# Patient Record
Sex: MALE | Race: WHITE | Employment: PART TIME | URBAN - METROPOLITAN AREA
[De-identification: names, ages, dates, MRNs, and addresses within clinical notes are randomized per-mention and may not be internally consistent; named-entity substitution may affect disease eponyms.]

---

## 2020-07-21 ENCOUNTER — HOSPITAL ENCOUNTER (EMERGENCY)
Age: 65
Discharge: HOME | End: 2020-07-21
Payer: MEDICARE

## 2020-07-21 ENCOUNTER — APPOINTMENT (OUTPATIENT)
Dept: GENERAL RADIOLOGY | Age: 65
End: 2020-07-21
Payer: MEDICARE

## 2020-07-21 VITALS
SYSTOLIC BLOOD PRESSURE: 146 MMHG | TEMPERATURE: 98.9 F | RESPIRATION RATE: 14 BRPM | HEIGHT: 67 IN | DIASTOLIC BLOOD PRESSURE: 82 MMHG | HEART RATE: 74 BPM | BODY MASS INDEX: 30.38 KG/M2 | OXYGEN SATURATION: 94 % | WEIGHT: 193.56 LBS

## 2020-07-21 PROCEDURE — 73140 X-RAY EXAM OF FINGER(S): CPT

## 2020-07-21 PROCEDURE — 6360000002 HC RX W HCPCS: Performed by: PHYSICIAN ASSISTANT

## 2020-07-21 PROCEDURE — 6370000000 HC RX 637 (ALT 250 FOR IP): Performed by: PHYSICIAN ASSISTANT

## 2020-07-21 PROCEDURE — 90471 IMMUNIZATION ADMIN: CPT | Performed by: PHYSICIAN ASSISTANT

## 2020-07-21 PROCEDURE — 90715 TDAP VACCINE 7 YRS/> IM: CPT | Performed by: PHYSICIAN ASSISTANT

## 2020-07-21 PROCEDURE — 99282 EMERGENCY DEPT VISIT SF MDM: CPT

## 2020-07-21 RX ORDER — BACITRACIN, NEOMYCIN, POLYMYXIN B 400; 3.5; 5 [USP'U]/G; MG/G; [USP'U]/G
OINTMENT TOPICAL ONCE
Status: COMPLETED | OUTPATIENT
Start: 2020-07-21 | End: 2020-07-21

## 2020-07-21 RX ORDER — CEPHALEXIN 500 MG/1
500 CAPSULE ORAL ONCE
Status: COMPLETED | OUTPATIENT
Start: 2020-07-21 | End: 2020-07-21

## 2020-07-21 RX ORDER — CEPHALEXIN 500 MG/1
500 CAPSULE ORAL 4 TIMES DAILY
Qty: 40 CAPSULE | Refills: 0 | Status: SHIPPED | OUTPATIENT
Start: 2020-07-21 | End: 2020-07-31

## 2020-07-21 RX ORDER — SULFAMETHOXAZOLE AND TRIMETHOPRIM 800; 160 MG/1; MG/1
1 TABLET ORAL 2 TIMES DAILY
Qty: 20 TABLET | Refills: 0 | Status: SHIPPED | OUTPATIENT
Start: 2020-07-21 | End: 2020-07-31

## 2020-07-21 RX ORDER — SULFAMETHOXAZOLE AND TRIMETHOPRIM 800; 160 MG/1; MG/1
1 TABLET ORAL ONCE
Status: COMPLETED | OUTPATIENT
Start: 2020-07-21 | End: 2020-07-21

## 2020-07-21 RX ADMIN — TETANUS TOXOID, REDUCED DIPHTHERIA TOXOID AND ACELLULAR PERTUSSIS VACCINE, ADSORBED 0.5 ML: 5; 2.5; 8; 8; 2.5 SUSPENSION INTRAMUSCULAR at 23:26

## 2020-07-21 RX ADMIN — SULFAMETHOXAZOLE AND TRIMETHOPRIM 1 TABLET: 800; 160 TABLET ORAL at 23:25

## 2020-07-21 RX ADMIN — CEPHALEXIN 500 MG: 500 CAPSULE ORAL at 23:25

## 2020-07-21 RX ADMIN — BACITRACIN ZINC, NEOMYCIN SULFATE, AND POLYMYXIN B SULFATE: 400; 3.5; 5 OINTMENT TOPICAL at 23:25

## 2020-07-21 ASSESSMENT — PAIN DESCRIPTION - PAIN TYPE
TYPE: ACUTE PAIN
TYPE: ACUTE PAIN

## 2020-07-21 ASSESSMENT — PAIN DESCRIPTION - ORIENTATION
ORIENTATION: LEFT
ORIENTATION: LEFT

## 2020-07-21 ASSESSMENT — PAIN DESCRIPTION - ONSET
ONSET: ON-GOING
ONSET: ON-GOING

## 2020-07-21 ASSESSMENT — PAIN DESCRIPTION - DESCRIPTORS
DESCRIPTORS: THROBBING
DESCRIPTORS: THROBBING

## 2020-07-21 ASSESSMENT — PAIN DESCRIPTION - PROGRESSION
CLINICAL_PROGRESSION: NOT CHANGED
CLINICAL_PROGRESSION: NOT CHANGED

## 2020-07-21 ASSESSMENT — PAIN DESCRIPTION - FREQUENCY
FREQUENCY: CONTINUOUS
FREQUENCY: CONTINUOUS

## 2020-07-21 ASSESSMENT — PAIN DESCRIPTION - LOCATION
LOCATION: FINGER (COMMENT WHICH ONE)
LOCATION: FINGER (COMMENT WHICH ONE)

## 2020-07-21 ASSESSMENT — PAIN SCALES - GENERAL
PAINLEVEL_OUTOF10: 2
PAINLEVEL_OUTOF10: 2

## 2020-07-22 NOTE — ED PROVIDER NOTES
[address] [facility]      Pt Name: Miguel Arredondo  MRN: [id number]  [place] 1955  Date of evaluation: 7/21/2020  Provider: YAMILETH Christianson    This patient was not seen and evaluated by the attending physician No att. providers found. CHIEF COMPLAINT       Chief Complaint   Patient presents with    Laceration     left hand finger yesterday at 1300        CRITICAL CARE TIME   I performed a total Critical Care time of  15 minutes, excluding separately reportable procedures. There was a high probability of clinically significant/life threatening deterioration in the patient's condition which required my urgent intervention. Not limited to multiple reexaminations, discussions with attending physician and consultants. HISTORY OF PRESENT ILLNESS  (Location/Symptom, Timing/Onset, Context/Setting, Quality, Duration, Modifying Factors, Severity.)   Miguel Arredondo is a 77 y.o. male who presents to the emergency department accompanied by his wife. He has a laceration to the base of his left middle finger. He is right-hand dominant. Tetanus not up-to-date. About 34 hours ago he was working on a car when his hand slipped and a piece of metal cut his finger. He cleaned it out with peroxide but noticed swelling redness and increasing pain today. He is not a diabetic he does not take blood thinners. No numbness or weakness. Nursing Notes were reviewed and I agree. REVIEW OF SYSTEMS    (2-9 systems for level 4, 10 or more for level 5)     Review of Systems   Constitutional: Negative for fever. Musculoskeletal: Positive for arthralgias and joint swelling. Skin: Positive for wound. Neurological: Negative for weakness and numbness. Psychiatric/Behavioral: Negative for agitation, behavioral problems and confusion. Except as noted above the remainder of the review of systems was reviewed and negative.        PAST MEDICAL HISTORY Diagnosis Date    Reflux        SURGICAL HISTORY           Procedure Laterality Date    CARPAL TUNNEL RELEASE Right 13       CURRENT MEDICATIONS       Previous Medications    IBUPROFEN (IBU) 600 MG TABLET    Take 1 tablet by mouth every 8 hours as needed for Pain. ALLERGIES     Patient has no known allergies. FAMILY HISTORY           Problem Relation Age of Onset    High Blood Pressure Mother     Heart Disease Father      Family Status   Relation Name Status    Mother   at age 72        blood clot    Father   at age 61        [place]        SOCIAL HISTORY      reports that he quit smoking about 16 years ago. He does not have any smokeless tobacco history on file. He reports current alcohol use. He reports that he does not use drugs. PHYSICAL EXAM    (up to 7 for level 4, 8 or more for level 5)     ED Triage Vitals [20 2215]   BP Temp Temp Source Pulse Resp SpO2 Height Weight   (!) 146/82 98.9 °F (37.2 °C) Oral 74 14 94 % 5' 7\" (1.702 m) 193 lb 9 oz (87.8 kg)       Physical Exam  Vitals signs and nursing note reviewed. Constitutional:       Appearance: Normal appearance. HENT:      Head: Normocephalic and atraumatic. Cardiovascular:      Rate and Rhythm: Normal rate. Pulses: Normal pulses. Pulmonary:      Effort: Pulmonary effort is normal.   Musculoskeletal: Normal range of motion. Left hand: He exhibits tenderness, laceration and swelling. He exhibits normal range of motion and normal capillary refill. Normal sensation noted. Normal strength noted. Hands:    Skin:     General: Skin is warm. Findings: Erythema present. Neurological:      General: No focal deficit present. Mental Status: He is alert and oriented to person, place, and time.    Psychiatric:         Mood and Affect: Mood normal.         Behavior: Behavior normal.         DIAGNOSTIC RESULTS     RADIOLOGY:   Non-plain film images such as CT, Ultrasound and MRI are read by the radiologist. Plain radiographic images are visualized and preliminarily interpreted by YAMILETH Harris with the below findings:    Reviewed radiologist's interpretation. Interpretation per the Radiologist below, if available at the time of this note:    XR FINGER LEFT (MIN 2 VIEWS)   Final Result   Soft tissue swelling. No acute fracture. No radiopaque foreign body. LABS:  Labs Reviewed - No data to display    All other labs were within normal range or not returned as of this dictation. EMERGENCY DEPARTMENT COURSE and DIFFERENTIAL DIAGNOSIS/MDM:   Vitals:    Vitals:    07/21/20 2215   BP: (!) 146/82   Pulse: 74   Resp: 14   Temp: 98.9 °F (37.2 °C)   TempSrc: Oral   SpO2: 94%   Weight: 193 lb 9 oz (87.8 kg)   Height: 5' 7\" (1.702 m)     I discussed with Ryne Allen and/or family the exam results, diagnosis, care, prognosis, reasons to return and the importance of follow up. Patient and/or family is in full agreement with plan and all questions have been answered. Specific discharge instructions explained, including reasons to return to the emergency department. Ryne Allen is well appearing, non-toxic, and afebrile at the time of discharge. Patient has an approximately 34-hour wound to left proximal finger at the base on the palmar aspect. His tetanus was updated the wound was cleaned and antibiotic ointment applied. Discussed risk of closing the wound with no active bleeding 34 hours later on the hand. Will avoid. Will advise him to use warm soapy soaks at least 3 times a day. He and his wife understand that this could works and he could require surgery or further intervention including IV antibiotics. Wife and patient are going to keep an eye on it and return for new or worsening. He has pain and swelling to the digit itself but has intact strength against resistance Both flexion and extension.     I estimate there is LOW risk for FRACTURE, COMPARTMENT SYNDROME, DEEP VENOUS THROMBOSIS, SEPTIC ARTHRITIS, TENDON OR NEUROVASCULAR INJURY, thus I consider the discharge disposition reasonable. CONSULTS:  None    PROCEDURES:  Procedures      FINAL IMPRESSION      1. Laceration of left hand without foreign body, initial encounter    2.  Finger infection          DISPOSITION/PLAN   DISPOSITION Decision To Discharge 07/21/2020 11:11:08 PM      PATIENT REFERRED TO:  Jeanne Bello MD  [address] [place] [address]  [phone]    Call in 1 day  For follow up      [address]:  New Prescriptions    CEPHALEXIN (KEFLEX) 500 MG CAPSULE    Take 1 capsule by mouth 4 times daily for 10 days    SULFAMETHOXAZOLE-TRIMETHOPRIM (BACTRIM DS) 800-160 MG PER TABLET    Take 1 tablet by mouth 2 times daily for 10 days       (Please note that portions of this note were completed with a voice recognition program.  Efforts were made to edit the dictations but occasionally words are mis-transcribed.)    Colten Nicholas, [address], [place]  07/21/20 9178

## 2020-07-22 NOTE — ED TRIAGE NOTES
Pt to ED with cut on left hand and left middle finger. Pt said he cut finger on piece of metal while working on his car yesterday. Pt states bleeding is difficult to control and has been bleeding intermittently since yesterday. Pt unaware of when last tetanus shot was.

## 2020-07-22 NOTE — ED NOTES
Handoff report given from Calixto, RN. I will assume care at this time.       Shahrzad Quintana RN  07/21/20 1673

## 2020-07-22 NOTE — ED NOTES
Discharge and education instructions reviewed. Patient verbalized understanding, teach-back successful. Patient denied questions at this time. No acute distress noted. Patient instructed to follow-up as noted - return to emergency department if symptoms worsen. Patient verbalized understanding. Discharged per EDMD with discharged instructions.        Unknown DEEPIKA Dong  07/21/20 5748

## 2020-07-27 ENCOUNTER — OFFICE VISIT (OUTPATIENT)
Dept: ORTHOPEDIC SURGERY | Age: 65
End: 2020-07-27
Payer: MEDICARE

## 2020-07-27 VITALS — RESPIRATION RATE: 16 BRPM | TEMPERATURE: 98.3 F

## 2020-07-27 PROCEDURE — G8417 CALC BMI ABV UP PARAM F/U: HCPCS | Performed by: PHYSICIAN ASSISTANT

## 2020-07-27 PROCEDURE — G8428 CUR MEDS NOT DOCUMENT: HCPCS | Performed by: PHYSICIAN ASSISTANT

## 2020-07-27 PROCEDURE — 99203 OFFICE O/P NEW LOW 30 MIN: CPT | Performed by: PHYSICIAN ASSISTANT

## 2020-07-27 PROCEDURE — 1123F ACP DISCUSS/DSCN MKR DOCD: CPT | Performed by: PHYSICIAN ASSISTANT

## 2020-07-27 PROCEDURE — 4004F PT TOBACCO SCREEN RCVD TLK: CPT | Performed by: PHYSICIAN ASSISTANT

## 2020-07-27 PROCEDURE — 4040F PNEUMOC VAC/ADMIN/RCVD: CPT | Performed by: PHYSICIAN ASSISTANT

## 2020-07-27 PROCEDURE — 3017F COLORECTAL CA SCREEN DOC REV: CPT | Performed by: PHYSICIAN ASSISTANT

## 2020-07-27 NOTE — PROGRESS NOTES
Mr. Gem Garduno is a 77 y.o. right handed individual  who is seen today in Hand Surgical Consultation at the request of Silvino Pierre DO. He is seen today regarding an injury occurring on July 20th, 2020. He reports having sustained a laceration to the Volar aspect of the  left Middle Finger when he accidentally cut it with a piece of steel. At the time of injury, there was substantial bleeding & there was not acute sensory loss. He was seen for Emergency evaluation elsewhere, radiographs were obtained. By report, his skin injury was not repaired after the wound was thoroughly cleansed. The nail structures were not involved in the injury and did not require repair at the time. He reports mild pain located in the Volar aspect of the Middle Finger, no tenderness of the wrist or elbow. He notes today, no numbness in the Radial Sensory Nerve Branch and Ulnar Dorsal Sensory Nerve Branch distribution of the Middle Finger. Symptoms are improving over time. The patient's , past medical history, medications, allergies,  family history, social history, and review of systems have been updated, reviewed and have been scanned into the chart today. Physical Exam:  Mr. Loyd Manning most recent vitals:  Vitals  Temp: 98.3 °F (36.8 °C)  Temp Source: Temporal  Resp: 16    He is well nourished, oriented to person, place & time. He demonstrates appropriate mood and affect as well as normal gait and station. Skin: There is  Transverse 1cm laceration on the Volar left Middle Finger closest to the MCP Joint, palmar digital crease which has not been sutured. The nail plate is present. No other digits show sign of skin injury bilaterally. There is no sign of drainage, erythema or infection. Digital range of motion is limited by pain on the Left, normal on the Right.  FDS function is Intact in the Index Finger, Middle Finger, Ring Finger and Small Finger, FDP function is Intact in the Index Finger, Middle Finger, Ring Finger and Small Finger, common extensor function is Intact in the Index Finger, Middle Finger, Ring Finger and Small Finger. Thumb EPL Function is Intact, Thumb EPB Function is Intact, Thumb FPL Function is Intact. All other digits demonstrate intact tendon function and motion bilaterally. Kanavel's Sign (for suppurative flexor tenosynovitis) is Negative in the left Middle Finger. The involved finger is not held at rest in a position of flexion, does not have palpable fullness along the flexor tendon sheath, does not have pain to palpation of the flexor tendon sheath, & is not exceedingly painful with passive flexion and extension. Wrist range of motion is Full and equal bilaterally bilaterally  There is no evidence of gross joint instability bilaterally. Muscular strength is clinically appropriate bilaterally, excepting the injured structures noted above. Sensation is normal in the Radial Sensory Nerve Branch and Ulnar Dorsal Sensory Nerve Branch distribution of the left Middle Finger all other digits demonstrate normal sensation bilaterally. Vascular examination reveals normal, good capillary refill and good color bilaterally. There is mild acute ecchymosis in the injured digit(s). Swelling is mild in the Middle Finger, absent elsewhere bilaterally  Maximal pain is elicited with palpation of the Volar region of the Middle Finger on the Left, normal on the Right. The base of the hand & wrist are not tender to palpation      Impression:  Mr. Nat Shelby has sustained recent left Middle Finger laceration without associated nerve laceration, without associated tendon laceration. He  presents requesting further treatment. Plan:  I have discussed with Mr. Nat Shelby the various treatment options for treatment of his left Middle Finger Laceration.   We discussed the options of Conservative treatment, allowing the soft tissues and bony structures to heal as well as possible, and surgical treatment in the form of formal exploration and repair of the injured structures as possible. he has elected to proceed conservativly, voicing an understanding of the other options available to him. I have explained the complications, limitations, expectations, alternatives, & risks of his chosen treatment. We discussed the possibility of residual symptoms as may be related to non-operative treatment of the lacerated finger, including the possiblities of: infection, poor healing of skin, and other involved structures, persistant deformity, persistant pain, persistent sensory loss, limitation of motion, future arthritic symptoms & the possible need for further treatment. He understood our discussion and was comfortable with his decision; he was provided with appropriate expectations. He is today fitted with a carefully applied finger dressing and given instruction in dressing changes. He was instructed in proper wound care and maintenance. I have asked him to schedule a follow-up appointment for 2 weeks from now for re-evaluation. He is specifically instructed to contact the office between now & his scheduled appointment if he has concerns related to the injured finger. He is welcome to call for an appointment sooner if he has any additional concerns or questions. I have also discussed with Mr. Tyrone Carrasco  the other treatment options available to him  for this condition. We have today selected to proceed with conservative management. He and I have agreed that if our current course of conservative treatment does not prove to be effective over the short term future, that he will schedule a follow-up appointment to discuss and select an alternate course of therapy including possibly injection or surgical treatment. Mr. Tyrone Carrasco has been given a full verbal list of instructions and precautions related to his present condition.   I have asked him to followup with me in the office at the prescribed time. He is also specifically requested to call or return to the office sooner if his symptoms change or worsen prior to the next scheduled appointment.

## 2020-07-27 NOTE — PATIENT INSTRUCTIONS
Protocol for Managing Open Wounds  Dr. Nicole Whiting. César            1. Please remove all dressings so that you may see the skin fully. 2. Prepare a clean sink full of warm water (bath temperature). Add one to two squirts of liquid antibacterial soap. Insert hand and soak for approximately five minutes, making sure to exercise your motion of fingers and wrists fully while soaking in warm water. 3. After finished soaking, pat wound dry. 4. Apply dressing as instructed in the office. A. Layer #1 - Adaptic gauze. B. Layer #2 - Light cotton gauze. Voncile Phil #3 - Tape or other covering. 5. I typically recommend soaking two to three times per day with a clean dressing change after each soak. Thank you for choosing [facility]) Physicians for your Hand and Upper Extremity needs. If we can be of any further assistance to you, please do not hesitate to contact us.     Office Phone Number:  [phone]  or  [phone]

## 2020-08-17 ENCOUNTER — OFFICE VISIT (OUTPATIENT)
Dept: ORTHOPEDIC SURGERY | Age: 65
End: 2020-08-17
Payer: MEDICARE

## 2020-08-17 VITALS — HEIGHT: 67 IN | WEIGHT: 193 LBS | RESPIRATION RATE: 16 BRPM | TEMPERATURE: 98.1 F | BODY MASS INDEX: 30.29 KG/M2

## 2020-08-17 PROBLEM — S61.213A LACERATION OF LEFT MIDDLE FINGER WITHOUT FOREIGN BODY WITHOUT DAMAGE TO NAIL: Status: ACTIVE | Noted: 2020-08-17

## 2020-08-17 PROCEDURE — G8417 CALC BMI ABV UP PARAM F/U: HCPCS | Performed by: PHYSICIAN ASSISTANT

## 2020-08-17 PROCEDURE — 3017F COLORECTAL CA SCREEN DOC REV: CPT | Performed by: PHYSICIAN ASSISTANT

## 2020-08-17 PROCEDURE — 1036F TOBACCO NON-USER: CPT | Performed by: PHYSICIAN ASSISTANT

## 2020-08-17 PROCEDURE — 4040F PNEUMOC VAC/ADMIN/RCVD: CPT | Performed by: PHYSICIAN ASSISTANT

## 2020-08-17 PROCEDURE — 99213 OFFICE O/P EST LOW 20 MIN: CPT | Performed by: PHYSICIAN ASSISTANT

## 2020-08-17 PROCEDURE — 1123F ACP DISCUSS/DSCN MKR DOCD: CPT | Performed by: PHYSICIAN ASSISTANT

## 2020-08-17 PROCEDURE — G8427 DOCREV CUR MEDS BY ELIG CLIN: HCPCS | Performed by: PHYSICIAN ASSISTANT

## 2020-08-17 NOTE — PROGRESS NOTES
bilaterally on the Left, normal on the Right  Vascular examination reveals normal, good capillary refill and good color bilaterally. There is no acute ecchymosis. Swelling is mild in the Middle Finger on the Left, normal on the Right  Muscular strength is clinically appropriate bilaterally. Examination for Stenosing Tenosynovitis demonstrates mild tenderness, thickening & nodularity at the A-1 pulley(s) of the Left Middle Finger. There is a palpable Nota's Node. There is triggering on active flexion with pain. No other digits demonstrate evidence of Stenosing Tenosynovitis. Impression:  Mr. Viral Velasquez is doing fairly well after recent left Middle Finger laceration. It would appear that he has substantially healed his injury. Plan:  Mr. Viral Velasquez is instructed in the appropriate short term protection of his freshly healing finger injury. We discussed the use of either a removable protective orthosis or activity adjustments as the situation demands. He is demonstrated the application and use of both devices. He is also instructed in work on Active & Passive range of motion of the digits, wrist, & elbow. These modalities were demonstrated to him today. We discussed the continued restrictions on the use of the injured hand and the limitations on resumption of activities until full range of motion and comfort have been regained. I have explained the time course and likely expectations for maximal recovery of motion and function. I have had a thorough discussion with Mr. Viral Velasquez regarding the treatment options available for his initially presenting left  Middle Finger stenosing tenosynovitis as a result of his injury, which is causing him significant symptoms and difficulty. I have outlined for Mr. Viral Velasquez the risk, benefits and consequences of the various treatment modalities, including a reasonable expectation for the long term success of each.   We have discussed

## 2020-08-17 NOTE — PATIENT INSTRUCTIONS
Thank you for choosing [facility]) Physicians for your Hand and Upper Extremity needs. If we can be of any further assistance to you, please do not hesitate to contact us.     Office Phone Number:  [phone]  or  [phone]

## 2020-09-25 ENCOUNTER — OFFICE VISIT (OUTPATIENT)
Dept: ORTHOPEDIC SURGERY | Age: 65
End: 2020-09-25
Payer: MEDICARE

## 2020-09-25 VITALS — RESPIRATION RATE: 16 BRPM | BODY MASS INDEX: 30.29 KG/M2 | HEIGHT: 67 IN | WEIGHT: 193 LBS | TEMPERATURE: 98.4 F

## 2020-09-25 PROBLEM — M65.332 TRIGGER MIDDLE FINGER OF LEFT HAND: Status: ACTIVE | Noted: 2020-09-25

## 2020-09-25 PROCEDURE — 99214 OFFICE O/P EST MOD 30 MIN: CPT | Performed by: PHYSICIAN ASSISTANT

## 2020-09-25 PROCEDURE — 1036F TOBACCO NON-USER: CPT | Performed by: PHYSICIAN ASSISTANT

## 2020-09-25 PROCEDURE — 4040F PNEUMOC VAC/ADMIN/RCVD: CPT | Performed by: PHYSICIAN ASSISTANT

## 2020-09-25 PROCEDURE — 1123F ACP DISCUSS/DSCN MKR DOCD: CPT | Performed by: PHYSICIAN ASSISTANT

## 2020-09-25 PROCEDURE — G8417 CALC BMI ABV UP PARAM F/U: HCPCS | Performed by: PHYSICIAN ASSISTANT

## 2020-09-25 PROCEDURE — 3017F COLORECTAL CA SCREEN DOC REV: CPT | Performed by: PHYSICIAN ASSISTANT

## 2020-09-25 PROCEDURE — G8427 DOCREV CUR MEDS BY ELIG CLIN: HCPCS | Performed by: PHYSICIAN ASSISTANT

## 2020-09-25 NOTE — PATIENT INSTRUCTIONS
Pre-Operative Instructions    1. The night before your surgery, unless otherwise instructed, do not eat any food, drink any liquids, chew gum or mints after midnight. Abstain from alcohol for 24 hours prior to surgery. 2. You will be contacted by the Hospital the working day prior to your procedure to confirm your arrival time. 3. Patients under 25years of age must have a parent or legal guardian present to sign their consent and discharge paperwork. 4. On the day of surgery,  you will be seen pre-operatively by an anesthesiologist.     5. If you are having hand surgery, it is recommended that nail polish and acrylic nails be removed prior to surgery if possible. 6. Please bring cases for glasses, contact lenses, hearing aids or dentures. They will likely be removed prior to surgery. 7. Wear casual, loose-fitting and comfortable clothing. Consider that you may have a large dressing to fit under your clothing after surgery. 9. Please do not bring valuables such as jewelry or large sums of cash to the hospital. Remove all body piercings before coming to the hospital. Jesu Perez may not  wear any rings on the hand if you are having surgery on that hand, wrist or elbow. 10. Do not smoke or chew tobacco before your surgery. [address] [place] [address] and surgery facilities are smoke-free environments. Smoking is not permitted anywhere on campus. 11. Be sure to follow any additional instructions from your physician. If the above conditions are not met, your surgery may be cancelled and rescheduled for another day. Should you develop any change in your health such as fever, cough, sore throat, cold, flu, or infection, or if you have any questions regarding your Pre-admission or surgery, please contact [address] - Surgery Scheduling at [phone], Monday through Friday, 9 a.m. to 5 p.m.

## 2020-09-25 NOTE — LETTER
? We have discussed the specific limitations and risks of hospital and/or office based treatment at this time due to the COVID-19 pandemic                I have been counseled about the risks of alicia Covid-19 in the helen-operative and post-operative periods related to this procedure. I have been made aware that alicia Covid-19 around the time of a surgical procedure may worsen my prognosis for recovering from the virus and lend to a higher morbidity and or mortality risk. With this knowledge, I have requested to proceed with the procedure as scheduled. 4. I have also been informed by the informing physician that there are other risks from both known and unknown causes that are attendant to the performance of any surgical procedure. I am aware that the practice of medicine and surgery is not an exact science, and that no guarantees have been made to me concerning the results of the operation and/or procedure(s). 5. I   CONSENT / REFUSE CONSENT  (strike the phrase that does not apply) to the taking of photographs before, during and/or after the operation or procedure for scientific/educational purposes. 6. I consent to the administration of anesthesia and to the use of such anesthetics as may be deemed advisable by the anesthesiologist who has been engaged by me or my physician.     7. I certify that I have read and understand the above consent to operation and/or other procedure(s); that the explanations therein referred to were made to me by the informing physician in advance of my signing this consent; that all blanks or statements requiring insertion or completion were filled in and inapplicable paragraphs, if any, were stricken before I signed; and that all questions asked by me about the operation and/or procedure(s) which I have consented to have been fully answered in a satisfactory manner.                                 _______________________           9/25/20 to explain your absence. A doctors note, or official forms (BWC, FMLA, etc.) will be filled out, upon request, to indicate your date of surgery and your restrictions as stated above. Dr. Rikki Boone' Narcotic Policy  Patients will only be prescribed narcotics after surgical procedures or significant injury. Not all procedures cause pain great enough to require Narcotics and thus, not all patients will receive prescriptions after surgical procedures or injuries. Narcotics are never prescribed for chronic conditions. Narcotics are never prescribed for use longer than one week at a time. Refills are only granted in unusual circumstances and only at Dr. Lorriane Merlin discretion. Patients who are receiving narcotic medication from another physician or who are under pain management contracts will not be given a prescription for narcotics for any reason. I have read the above policies and understand that by agreeing to proceed with treatment by Dr. Noe Alva and his team, that I am agreeing to abide by these policies.   Patient Name:  Jihan Panchal    Patient Signature:  _____________________________    Alida Link Date:   9/25/20

## 2020-09-25 NOTE — LETTER
Pre-Operative Antibiotic Order:    No Known Allergies       [x]  ----  No Antibiotic Ordered       []  ----  Give the following Antibiotic within 1 hour prior to start time:         Ancef 1 gram IV if patient is less than 200 pounds    or       Ancef 2 grams IV if patient is greater than 200 pounds    or      Vancomycin 1 gram IV (over 1 hour) if patient is allergic to           PENICILLINS or CEFALOSPORINS          SURG       10-15                   COVID  10-9                        H&P                  PCP__XX__      Procedure: left Middle Finger Trigger Finger Release     Patient: Viral Velasquez  :    1955    Physician Signature:     Date: 20  Time: 10:00 AM

## 2020-09-25 NOTE — PROGRESS NOTES
Mr. Viktor Campbell returns today in follow-up of his recent left Middle Finger Volar laceration which occurred approximately 2 months ago. He has been treated with conservative wound care measures and range of motion  He has noted increased discomfort and increased swelling. He states that he has trouble fully bending his finger and that his symptoms are worse when he wakes up in the morning. He notes no symptoms of numbness, tingling, no symptoms related to perfusion. The patient's , past medical history, medications, allergies,  family history, social history, and review of systems have been reviewed and are recorded in the chart. Physical Exam:  Vitals  Temp: 98.4 °F (36.9 °C)  Temp Source: Infrared  Resp: 16  Height: 5' 7\" (170.2 cm)  Weight: 193 lb (87.5 kg)  Mr. Viktor Campbell appears well, he is in no apparent distress, he demonstrates appropriate mood & affect. Skin: Laceration is fully healed Left, Middle Finger  Digital range of motion is limited by pain and swelling on the Left, normal on the Right. FDS function is Intact in the Thumb, FDP function is Intact in the Middle Finger, common extensor function is Intact in the Middle Finger. Thumb EPL Function is Intact, Thumb EPB Function is Intact, Thumb FPL Function is Intact. Tendon function is otherwise normal bilaterally. Wrist range of motion is Full and equal bilaterally bilaterally  There is no evidence of gross joint instability bilaterally. Sensation is subjectively normal in the Radial Digital Nerve and Ulnar Digital Nerve distribution of the left Middle Finger all other digits demonstrate normal sensation bilaterally on the Left, normal on the Right  Vascular examination reveals normal, good capillary refill and good color bilaterally. There is no acute ecchymosis. Swelling is mild in the Middle Finger on the Left, normal on the Right  Muscular strength is clinically appropriate bilaterally.   Examination for Stenosing Tenosynovitis demonstrates moderate tenderness, thickening & nodularity at the A-1 pulley(s) of the Left Middle Finger. There is a palpable Nota's Node. There is triggering on active flexion with pain. No other digits demonstrate evidence of Stenosing Tenosynovitis. Impression:  Mr. Blake Helm is doing well after recent left Middle Finger laceration. It would appear that he has substantially healed his injury. It appears that he has now developed a left middle finger trigger finger. Plan:  I have had a thorough discussion with Mr. Blake Helm regarding the treatment options available for his initially presenting Middle Finger stenosing tenosynovitis, which is causing him significant  limitations. I have outlined for Mr. Blake Helm the benefits and consequences of the various treatment modalities, including the fact that surgical treatment is the only modality which is reasonably expected to provide long lasting or permanent resolution of his symptoms. Based upon our current discussion and a reasonable understating of the options available to him, Mr. Blake Helm has selected to proceed with surgical Middle Finger Trigger Finger Release. I have discussed the details of the surgical procedure, the pre, helen and postoperative concerns and the appropriate expectations after surgery with Mr. Blake Helm today. He was given the opportunity to ask questions, voiced an understanding of the procedure, and he did wish to proceed with Left Middle Finger Trigger Finger Release (A1 Pulley Release). I had an extensive discussion with Mr. Blaek Helm (and any family members present with him today) regarding the natural history, etiology, and long term consequences of this problem. We have mutually selected a surgical treatment plan  and, in my opinion, surgical intervention is indicated at this time.  I have discussed with him the potential complications, limitations, expectations, alternatives, and risks of Trigger Finger Release. He has had full opportunity to ask his questions. I have answered them all to his satisfaction. I feel that Mr. Ton Castanon (and any family members present with him today) do understand our discussion today and he has provided informed consent for Left Middle Finger Trigger Finger Release (A1 Pulley Release). I have also discussed with Mr. Ton Castanon the other treatment options available to him for this condition. We have today selected to proceed with Surgical treatment. He has voiced and  understanding that there are other less aggressive treatment options which are available in this situation, albeit possibly less efficacious or durable, and he is comfortable with the plan that he has chosen. Mr. Ton Castanon has been given a full verbal list of instructions and precautions related to his present condition. I have asked him to followup with me in the office at the prescribed time. He is also specifically requested to call or return to the office sooner if his symptoms change or worsen prior to the next scheduled appointment.

## 2020-10-09 ENCOUNTER — OFFICE VISIT (OUTPATIENT)
Dept: PRIMARY CARE CLINIC | Age: 65
End: 2020-10-09
Payer: MEDICARE

## 2020-10-09 PROCEDURE — 99211 OFF/OP EST MAY X REQ PHY/QHP: CPT | Performed by: NURSE PRACTITIONER

## 2020-10-09 PROCEDURE — G8428 CUR MEDS NOT DOCUMENT: HCPCS | Performed by: NURSE PRACTITIONER

## 2020-10-09 PROCEDURE — G8417 CALC BMI ABV UP PARAM F/U: HCPCS | Performed by: NURSE PRACTITIONER

## 2020-10-10 LAB — SARS-COV-2, NAA: NOT DETECTED

## 2020-10-12 NOTE — PROGRESS NOTES
Pre-admission testing medication reconciliation attempted today. Martin Gonzalez is unsure of current medications. Patient chart sent to [facility] for further review.     Rosibel Michaels RN

## 2020-10-12 NOTE — PROGRESS NOTES

## 2020-10-12 NOTE — PROGRESS NOTES
[address] [facility] [place] time_______0830_____        Surgery time_____1005_______    Take the following medications with a sip of water: Follow your MD/Surgeons pre-procedure instructions regarding your medications    Do not eat or drink anything after 12:00 midnight prior to your surgery. This includes water chewing gum, mints and ice chips. You may brush your teeth and gargle the morning of your surgery, but do not swallow the water     Please see your family doctor/pediatrician for a history and physical and/or concerning medications. Bring any test results/reports from your physicians office. If you are under the care of a heart doctor or specialist doctor, please be aware that you may be asked to them for clearance    You may be asked to stop blood thinners such as Coumadin, Plavix, Fragmin, Lovenox, etc., or any anti-inflammatories such as:  Aspirin, Ibuprofen, Advil, Naproxen prior to your surgery. We also ask that you stop any OTC medications such as fish oil, vitamin E, glucosamine, garlic, Multivitamins, COQ 10, etc.    We ask that you do not smoke 24 hours prior to surgery  We ask that you do not  drink any alcoholic beverages 24 hours prior to surgery     You must make arrangements for a responsible adult to take you home after your surgery. For your safety you will not be allowed to leave alone or drive yourself home. Your surgery will be cancelled if you do not have a ride home. Also for your safety, it is strongly suggested that someone stay with you the first 24 hours after your surgery. A parent or legal guardian must accompany a child scheduled for surgery and plan to stay at the hospital until the child is discharged. Please do not bring other children with you. For your comfort, please wear simple loose fitting clothing to the hospital.  Please do not bring valuables.     Do not wear any make-up or nail polish on your fingers or toes      For your safety, please do not wear any jewelry or body piercing's on the day of surgery. All jewelry must be removed. If you have dentures, they will be removed before going to operating room. For your convenience, we will provide you with a container. If you wear contact lenses or glasses, they will be removed, please bring a case for them. If you have a living will and a durable power of  for healthcare, please bring in a copy. As part of our patient safety program to minimize surgical site infections, we ask you to do the following:    · Please notify your surgeon if you develop any illness between         now and the  day of your surgery. · This includes a cough, cold, fever, sore throat, nausea,         or vomiting, and diarrhea, etc.  ·  Please notify your surgeon if you experience dizziness, shortness         of breath or blurred vision between now and the time of your surgery. Do not shave your operative site 96 hours prior to surgery. For face and neck surgery, men may use an electric razor 48 hours   prior to surgery. You may shower the night before surgery or the morning of   your surgery with an antibacterial soap. You will need to bring a photo ID and insurance card    [facility] has an onsite pharmacy, would you like to utilize our pharmacy     If you will be staying overnight and use a C-pap machine, please bring   your C-pap to hospital     Our goal is to provide you with excellent care, therefore, visitors will be limited to two(2) in the room at a time so that we may focus on providing this care for you. Please contact pre-admission testing if you have any further questions. [facility] phone number:  [phone] PAT fax number:  [phone]  Please note these are generalized instructions for all surgical cases, you may be provided with more specific instructions according to your surgery.

## 2020-10-12 NOTE — PROGRESS NOTES
C-Difficile admission screening and protocol:     * Admitted with diarrhea? YES____    NO__X___     *Prior history of C-Diff. In last 3 months? YES____   NO__X___     *Antibiotic use in the past 6-8 weeks? NO__X____YES______                 If yes which  ANTIBIOTIC AND REASON______     *Prior hospitalization or nursing home in the last month?  YES____   NO__X__

## 2020-10-13 ENCOUNTER — TELEPHONE (OUTPATIENT)
Dept: PHARMACY | Age: 65
End: 2020-10-13

## 2020-10-13 DIAGNOSIS — G56.00 CTS (CARPAL TUNNEL SYNDROME): ICD-10-CM

## 2020-10-13 RX ORDER — FAMOTIDINE 40 MG/1
1 TABLET, FILM COATED ORAL 2 TIMES DAILY
COMMUNITY
Start: 2020-07-30 | End: 2020-11-12 | Stop reason: SDUPTHER

## 2020-10-13 RX ORDER — LEVOTHYROXINE SODIUM 125 UG/1
1 TABLET ORAL DAILY
COMMUNITY
Start: 2020-07-31 | End: 2020-11-18

## 2020-10-13 RX ORDER — IBUPROFEN 200 MG
200-400 TABLET ORAL EVERY 6 HOURS PRN
COMMUNITY

## 2020-10-13 RX ORDER — TADALAFIL 20 MG/1
20 TABLET ORAL PRN
COMMUNITY
End: 2021-12-27 | Stop reason: SDUPTHER

## 2020-10-13 RX ORDER — HYDROXYZINE HYDROCHLORIDE 25 MG/1
1 TABLET, FILM COATED ORAL 3 TIMES DAILY PRN
COMMUNITY
Start: 2020-09-30 | End: 2020-11-06 | Stop reason: SDUPTHER

## 2020-10-13 NOTE — TELEPHONE ENCOUNTER
Medication Reconciliation    List of medications patient is currently taking is complete.     Source of information: 1. Conversation with patient by phone.  [phone]. [facility] records      Allergies  Patient has no known allergies.     Notes regarding home medications:   1. Patient currently taking ibuprofen 200mg (1-2Q6H PRN) Last taken 10/10/20    2.  Patient currently taking Tadalafil 20mg (1QD PRN) Last taken roughly 10/06/20         Tana CROSS   10/13/2020  1:48 PM

## 2020-10-13 NOTE — RESULT ENCOUNTER NOTE
Your Covid-10 teste resulted not detected/negative. What happens if I have a negative test?    Remember to wash your hands often, avoid touching your face, stay 6 feet from people you do not live with, and wear a cloth facemask when you go out in public. A negative COVID-19 test at one point in time does not mean you will stay negative. You could become ill with COVID-19 and/or test positive at any time. If you are a close contact of a confirmed or suspected case, continue to stay home and away from others until 14 days after your last exposure. If you do not have symptoms, and were not in close contact with a confirmed or suspected case, you can stop isolating. If you currently have symptoms of COVID-19, and were not in close contact with a confirmed or suspected case, you should keep monitoring symptoms and talk to your doctor or other healthcare provider about staying home and if you need to get tested again. If you develop symptoms of COVID-19, stay at home and away from others and talk to your doctor or other healthcare provider about getting tested again. For additional information, visit coronavirus. [place].gov. For answers to your COVID-19 questions, call [phone]-[facility] ([phone]).

## 2020-10-13 NOTE — TELEPHONE ENCOUNTER
Medication Reconciliation    List of medications patient is currently taking is complete.     Source of information: 1. Conversation with patient by phone. 2. EPIC records      Allergies  Patient has no known allergies.     Notes regarding home medications:   1. Patient currently taking ibuprofen 200mg (1-2Q6H PRN) Last taken. ...         Jose Brooks   10/13/2020  1:33 PM

## 2020-10-14 ENCOUNTER — ANESTHESIA EVENT (OUTPATIENT)
Dept: OPERATING ROOM | Age: 65
End: 2020-10-14
Payer: MEDICARE

## 2020-10-15 ENCOUNTER — HOSPITAL ENCOUNTER (OUTPATIENT)
Age: 65
Setting detail: OUTPATIENT SURGERY
Discharge: HOME | End: 2020-10-15
Attending: ORTHOPAEDIC SURGERY | Admitting: ORTHOPAEDIC SURGERY
Payer: MEDICARE

## 2020-10-15 ENCOUNTER — ANESTHESIA (OUTPATIENT)
Dept: OPERATING ROOM | Age: 65
End: 2020-10-15
Payer: MEDICARE

## 2020-10-15 VITALS
SYSTOLIC BLOOD PRESSURE: 148 MMHG | DIASTOLIC BLOOD PRESSURE: 91 MMHG | RESPIRATION RATE: 16 BRPM | HEART RATE: 53 BPM | BODY MASS INDEX: 29.78 KG/M2 | TEMPERATURE: 97.8 F | WEIGHT: 189.71 LBS | HEIGHT: 67 IN | OXYGEN SATURATION: 96 %

## 2020-10-15 VITALS — DIASTOLIC BLOOD PRESSURE: 88 MMHG | OXYGEN SATURATION: 100 % | SYSTOLIC BLOOD PRESSURE: 125 MMHG

## 2020-10-15 PROCEDURE — 3700000000 HC ANESTHESIA ATTENDED CARE: Performed by: ORTHOPAEDIC SURGERY

## 2020-10-15 PROCEDURE — 2709999900 HC NON-CHARGEABLE SUPPLY: Performed by: ORTHOPAEDIC SURGERY

## 2020-10-15 PROCEDURE — 7100000001 HC PACU RECOVERY - ADDTL 15 MIN: Performed by: ORTHOPAEDIC SURGERY

## 2020-10-15 PROCEDURE — 7100000011 HC PHASE II RECOVERY - ADDTL 15 MIN: Performed by: ORTHOPAEDIC SURGERY

## 2020-10-15 PROCEDURE — 3600000015 HC SURGERY LEVEL 5 ADDTL 15MIN: Performed by: ORTHOPAEDIC SURGERY

## 2020-10-15 PROCEDURE — 3600000005 HC SURGERY LEVEL 5 BASE: Performed by: ORTHOPAEDIC SURGERY

## 2020-10-15 PROCEDURE — 2580000003 HC RX 258: Performed by: ANESTHESIOLOGY

## 2020-10-15 PROCEDURE — 2500000003 HC RX 250 WO HCPCS: Performed by: ORTHOPAEDIC SURGERY

## 2020-10-15 PROCEDURE — 2500000003 HC RX 250 WO HCPCS: Performed by: NURSE ANESTHETIST, CERTIFIED REGISTERED

## 2020-10-15 PROCEDURE — 7100000010 HC PHASE II RECOVERY - FIRST 15 MIN: Performed by: ORTHOPAEDIC SURGERY

## 2020-10-15 PROCEDURE — 7100000000 HC PACU RECOVERY - FIRST 15 MIN: Performed by: ORTHOPAEDIC SURGERY

## 2020-10-15 PROCEDURE — 6360000002 HC RX W HCPCS: Performed by: NURSE ANESTHETIST, CERTIFIED REGISTERED

## 2020-10-15 PROCEDURE — 3700000001 HC ADD 15 MINUTES (ANESTHESIA): Performed by: ORTHOPAEDIC SURGERY

## 2020-10-15 RX ORDER — LIDOCAINE HYDROCHLORIDE 20 MG/ML
INJECTION, SOLUTION EPIDURAL; INFILTRATION; INTRACAUDAL; PERINEURAL PRN
Status: DISCONTINUED | OUTPATIENT
Start: 2020-10-15 | End: 2020-10-15 | Stop reason: SDUPTHER

## 2020-10-15 RX ORDER — PROPOFOL 10 MG/ML
INJECTION, EMULSION INTRAVENOUS PRN
Status: DISCONTINUED | OUTPATIENT
Start: 2020-10-15 | End: 2020-10-15 | Stop reason: SDUPTHER

## 2020-10-15 RX ORDER — SODIUM CHLORIDE 0.9 % (FLUSH) 0.9 %
10 SYRINGE (ML) INJECTION EVERY 12 HOURS SCHEDULED
Status: DISCONTINUED | OUTPATIENT
Start: 2020-10-15 | End: 2020-10-15 | Stop reason: HOSPADM

## 2020-10-15 RX ORDER — SODIUM CHLORIDE 9 MG/ML
INJECTION, SOLUTION INTRAVENOUS CONTINUOUS
Status: DISCONTINUED | OUTPATIENT
Start: 2020-10-15 | End: 2020-10-15 | Stop reason: HOSPADM

## 2020-10-15 RX ORDER — PROPOFOL 10 MG/ML
INJECTION, EMULSION INTRAVENOUS CONTINUOUS PRN
Status: DISCONTINUED | OUTPATIENT
Start: 2020-10-15 | End: 2020-10-15 | Stop reason: SDUPTHER

## 2020-10-15 RX ORDER — SODIUM CHLORIDE 0.9 % (FLUSH) 0.9 %
10 SYRINGE (ML) INJECTION PRN
Status: DISCONTINUED | OUTPATIENT
Start: 2020-10-15 | End: 2020-10-15 | Stop reason: HOSPADM

## 2020-10-15 RX ADMIN — SODIUM CHLORIDE: 9 INJECTION, SOLUTION INTRAVENOUS at 08:59

## 2020-10-15 RX ADMIN — PROPOFOL 80 MG: 10 INJECTION, EMULSION INTRAVENOUS at 10:05

## 2020-10-15 RX ADMIN — PROPOFOL 120 MCG/KG/MIN: 10 INJECTION, EMULSION INTRAVENOUS at 10:05

## 2020-10-15 RX ADMIN — LIDOCAINE HYDROCHLORIDE 80 MG: 20 INJECTION, SOLUTION EPIDURAL; INFILTRATION; INTRACAUDAL; PERINEURAL at 10:05

## 2020-10-15 ASSESSMENT — PAIN DESCRIPTION - FREQUENCY
FREQUENCY: CONTINUOUS
FREQUENCY: CONTINUOUS

## 2020-10-15 ASSESSMENT — PULMONARY FUNCTION TESTS
PIF_VALUE: 0

## 2020-10-15 ASSESSMENT — PAIN - FUNCTIONAL ASSESSMENT
PAIN_FUNCTIONAL_ASSESSMENT: PREVENTS OR INTERFERES SOME ACTIVE ACTIVITIES AND ADLS
PAIN_FUNCTIONAL_ASSESSMENT: PREVENTS OR INTERFERES SOME ACTIVE ACTIVITIES AND ADLS
PAIN_FUNCTIONAL_ASSESSMENT: 0-10

## 2020-10-15 ASSESSMENT — PAIN DESCRIPTION - PAIN TYPE
TYPE: SURGICAL PAIN
TYPE: SURGICAL PAIN

## 2020-10-15 ASSESSMENT — PAIN DESCRIPTION - PROGRESSION
CLINICAL_PROGRESSION: NOT CHANGED
CLINICAL_PROGRESSION: NOT CHANGED

## 2020-10-15 ASSESSMENT — PAIN DESCRIPTION - ORIENTATION
ORIENTATION: LEFT
ORIENTATION: LEFT

## 2020-10-15 ASSESSMENT — PAIN DESCRIPTION - ONSET
ONSET: ON-GOING
ONSET: ON-GOING

## 2020-10-15 ASSESSMENT — ENCOUNTER SYMPTOMS: SHORTNESS OF BREATH: 0

## 2020-10-15 ASSESSMENT — PAIN DESCRIPTION - DESCRIPTORS
DESCRIPTORS: SORE
DESCRIPTORS: OTHER (COMMENT)

## 2020-10-15 ASSESSMENT — PAIN SCALES - GENERAL
PAINLEVEL_OUTOF10: 0
PAINLEVEL_OUTOF10: 1
PAINLEVEL_OUTOF10: 0
PAINLEVEL_OUTOF10: 1

## 2020-10-15 ASSESSMENT — PAIN DESCRIPTION - LOCATION
LOCATION: WRIST
LOCATION: WRIST

## 2020-10-15 NOTE — PROGRESS NOTES
Patient admitted to PACU from OR. Patient opens eyes to name, drowsy. Resp easy unlabored on 2L NC with SaO2 97%. Left wrist dressing dry and intact, elevated on pillow. Patient able to move fingers to left hand with middle finger numbness per patient. Patient denies C/O pain or nausea. VSS. IV patent to right hand.

## 2020-10-15 NOTE — H&P
Pre-operative Update of H&P:    I  have seen & examined Mr. Maggie Gibbs related solely to his hand and upper extremity conditions, prior to the scheduled procedure on the date of his surgery. The indications for the planned surgical procedure & and his upper-extremity condition are unchanged.

## 2020-10-15 NOTE — ANESTHESIA POSTPROCEDURE EVALUATION
1110, Pulse:50, Resp:18, SpO2:96 %  10/15/20 1109, Pulse:54, Resp:18, SpO2:95 %  10/15/20 1108, Pulse:51, Resp:16, SpO2:95 %  10/15/20 1107, Pulse:50, Resp:12, SpO2:97 %  10/15/20 1106, Pulse:51, Resp:16, SpO2:96 %  10/15/20 1105, Pulse:52, Resp:15, SpO2:96 %  10/15/20 1104, Pulse:52, Resp:19, SpO2:95 %  10/15/20 1103, Pulse:54, Resp:19, SpO2:96 %  10/15/20 1102, BP:(!) 144/85, Pulse:54, Resp:18, SpO2:95 %  10/15/20 1101, Pulse:53, Resp:19, SpO2:95 %  10/15/20 1100, Pulse:51, Resp:15, SpO2:95 %  10/15/20 1059, Pulse:55, Resp:17, SpO2:95 %  10/15/20 1058, Pulse:58, Resp:18, SpO2:95 %  10/15/20 1057, Pulse:(!) 49, Resp:16, SpO2:96 %  10/15/20 1056, Pulse:(!) 49, Resp:20, SpO2:96 %  10/15/20 1055, Pulse:50, Resp:15, SpO2:96 %  10/15/20 1054, Pulse:53, Resp:14, SpO2:95 %  10/15/20 1053, Pulse:55, Resp:15, SpO2:96 %  10/15/20 1052, Pulse:57, Resp:18, SpO2:94 %  10/15/20 1051, Pulse:52, Resp:13, SpO2:96 %  10/15/20 1050, Pulse:53, Resp:16, SpO2:95 %  10/15/20 1049, Pulse:56, Resp:14, SpO2:95 %  10/15/20 1048, Temp:97 °F (36.1 °C), Temp src:Temporal, Pulse:53, Resp:16, SpO2:95 %  10/15/20 1047, BP:134/89, Pulse:53, Resp:17, SpO2:95 %  10/15/20 1046, Pulse:54, Resp:17, SpO2:95 %  10/15/20 1045, Pulse:53, Resp:18, SpO2:95 %  10/15/20 1044, Pulse:54, Resp:15, SpO2:95 %  10/15/20 1043, BP:136/88, Pulse:55, Resp:18, SpO2:95 %  10/15/20 1042, BP:(!) 134/102, Pulse:53, Resp:19, SpO2:95 %  10/15/20 1041, Pulse:58, Resp:16, SpO2:96 %  10/15/20 1040, SpO2:96 %  10/15/20 1039, Pulse:54, Resp:22, SpO2:96 %  10/15/20 1038, Pulse:62, Resp:20, SpO2:96 %  10/15/20 1037, BP:118/81, Pulse:53, Resp:16, SpO2:97 %  10/15/20 1036, Pulse:52, Resp:15, SpO2:97 %  10/15/20 1035, Pulse:53, Resp:15, SpO2:96 %  10/15/20 1034, Pulse:52, Resp:16, SpO2:96 %  10/15/20 1033, Pulse:53, Resp:15, SpO2:97 %  10/15/20 1032, BP:132/84, Pulse:53, Resp:16, SpO2:94 %  10/15/20 1031, Pulse:54, Resp:17  10/15/20 1030, Pulse:53, Resp:13, SpO2:97 %  10/15/20 1029, Pulse:53, Resp:14, SpO2:98 %  10/15/20 1028, Pulse:60, Resp:13, SpO2:97 %  10/15/20 1027, BP:121/85, Pulse:58, Resp:13, SpO2:97 %  10/15/20 1026, Pulse:62, Resp:19  10/15/20 1025, Pulse:60, Resp:16, SpO2:98 %  10/15/20 1024, BP:124/85, Pulse:63, Resp:16, SpO2:98 %  10/15/20 1022, BP:121/85, Temp:97 °F (36.1 °C), Temp src:Temporal, Pulse:60, Resp:13, SpO2:97 %  10/15/20 0849, BP:(!) 138/91, Temp:97.1 °F (36.2 °C), Temp src:Temporal, Pulse:56, Resp:15, SpO2:97 %, Height:5' 7\" (1.702 m), Weight:189 lb 11.3 oz (86 kg)     LABS:    CBC  No results found for: WBC, HGB, HCT, PLT  RENAL  Lab Results       Component                Value               Date/Time                  NA                       139                 04/08/2013 01:23 PM        K                        4.2                 04/08/2013 01:23 PM        CL                       106                 04/08/2013 01:23 PM        CO2                      26                  04/08/2013 01:23 PM        BUN                      17                  04/08/2013 01:23 PM        CREATININE               0.7 (L)             04/08/2013 01:23 PM        GLUCOSE                  85                  04/08/2013 01:23 PM   COAGS  No results found for: PROTIME, INR, APTT    Intake & Output:  @24HRIO@    Nausea & Vomiting:  No    Level of Consciousness:  Awake    Pain Assessment:  Adequate analgesia    Anesthesia Complications:  No apparent anesthetic complications    SUMMARY      Vital signs stable  OK to discharge from Stage I post anesthesia care.   Care transferred from Anesthesiology department on discharge from perioperative area

## 2020-10-15 NOTE — OP NOTE
was carefully identified and cleared of surrounding soft tissue. The A1 pulley was identified and incised longitudinally along its entire length under direct visualization. The flexor tendons were gently withdrawn from the sheath and inspected. They were found to be in poor condition demonstrating a partial laceration of both Flexor Digitorum Profundus and Flexor Digitorum Superficialis. As one slip of the Flexor Digitorum Superficialis was essentially detached, I elected to excise the severely damaged tendon portions. The tendons were returned to their appropriate location and the finger was placed through a full range of motion. There was no evidence of residual stenosis or triggering. The wound was irrigated copiously with sterile saline for irrigation and the pneumo-tourniquet was deflated after a period of 6 minutes elevation. Hemostasis was easily obtained with direct pressure and electrocautery. The wound was closed with interrupted sutures in the skin. The wound was dressed with adaptic, dry sterile gauze, and a bulky, hand based dressing was applied. Mr. Azucena Whitfield  was awakened from light sedation, having tolerated the procedure without apparent complication, and was returned to the recovery room in stable condition. At the conclusion of the procedure all needle, instrument, and sponge counts were correct. Janine Meeks MD   10/15/2020, 10:22 AM

## 2020-10-15 NOTE — PROGRESS NOTES
Patient more awake and alert. Resp easy unlabored on room air O2 with SaO2 955. Monitor in SB, HR 52. Patient denies C/O pain or nausea. VSS.

## 2020-10-15 NOTE — ANESTHESIA PRE PROCEDURE
[facility] Department of Anesthesiology  Pre-Anesthesia Evaluation/Consultation       Name:  Mikey Faria  : 1955  Age:  77 y.o. MRN:  [id number]  Date: 10/15/2020           Surgeon: Surgeon(s):  Haley Wolfe MD    Procedure: Procedure(s):  LEFT MIDDLE FINGER TRIGGER FINGER RELEASE     No Known Allergies  Patient Active Problem List   Diagnosis    CTS (carpal tunnel syndrome)    GERD (gastroesophageal reflux disease)    Laceration of left middle finger without foreign body without damage to nail    Trigger middle finger of left hand     Past Medical History:   Diagnosis Date    [place] (hard of hearing)     Reflux     Thyroid disease     Wears hearing aid     bilateral     Past Surgical History:   Procedure Laterality Date    CARPAL TUNNEL RELEASE Right 13    EYE SURGERY Bilateral     Lasik     Social History     Tobacco Use    Smoking status: Former Smoker     Types: Cigarettes    Smokeless tobacco: Never Used    Tobacco comment: quit smoking over 20 years ago   Substance Use Topics    Alcohol use: Not Currently     Comment: social    Drug use: Never     Medications  No current facility-administered medications on file prior to encounter. No current outpatient medications on file prior to encounter.      Current Facility-Administered Medications   Medication Dose Route Frequency Provider Last Rate Last Dose    0.9 % sodium chloride infusion   Intravenous Continuous Leandra Tillman MD 75 mL/hr at 10/15/20 0859      sodium chloride flush 0.9 % injection 10 mL  10 mL Intravenous 2 times per day Leandra Tillman MD        sodium chloride flush 0.9 % injection 10 mL  10 mL Intravenous PRN Leandra Tillman MD         Vital Signs (Current)   Vitals:    10/15/20 0849   BP: (!) 138/91   Pulse: 56   Resp: 15   Temp: 97.1 °F (36.2 °C)   TempSrc: Temporal   SpO2: 97%   Weight: 189 lb 11.3 oz (86 kg)   Height: 5' 7\" (1.702 m) BP Readings from Last 3 Encounters:   10/15/20 (!) 138/91   20 (!) 146/82   13 139/83     Vital Signs Statistics (for past 48 hrs)     Temp  Av.1 °F (36.2 °C)  Min: 97.1 °F (36.2 °C)   Min taken time: 10/15/20 0849  Max: 97.1 °F (36.2 °C)   Max taken time: 10/15/20 0849  Pulse  Av  Min: 64   Min taken time: 10/15/20 0849  Max: 64   Max taken time: 10/15/20 0849  Resp  Avg: 15  Min: 15   Min taken time: 10/15/20 0849  Max: 15   Max taken time: 10/15/20 0849  BP  Min: 138/91   Min taken time: 10/15/20 0849  Max: 138/91   Max taken time: 10/15/20 0849  SpO2  Av %  Min: 97 %   Min taken time: 10/15/20 0849  Max: 97 %   Max taken time: 10/15/20 0849  BP Readings from Last 3 Encounters:   10/15/20 (!) 138/91   20 (!) 146/82   13 139/83       BMI  Body mass index is 29.71 kg/m². Estimated body mass index is 29.71 kg/m² as calculated from the following:    Height as of this encounter: 5' 7\" (1.702 m). Weight as of this encounter: 189 lb 11.3 oz (86 kg). CBC No results found for: WBC, RBC, HGB, HCT, MCV, RDW, PLT  CMP    Lab Results   Component Value Date     2013    K 4.2 2013     2013    CO2 26 2013    BUN 17 2013    CREATININE 0.7 2013    GFRAA >60 2013    GLUCOSE 85 2013    CALCIUM 9.0 2013     BMP    Lab Results   Component Value Date     2013    K 4.2 2013     2013    CO2 26 2013    BUN 17 2013    CREATININE 0.7 2013    CALCIUM 9.0 2013    GFRAA >60 2013    GLUCOSE 85 2013     POCGlucose  No results for input(s): GLUCOSE in the last 72 hours.    Coags  No results found for: PROTIME, INR, APTT  HCG (If Applicable) No results found for: PREGTESTUR, PREGSERUM, HCG, HCGQUANT   ABGs No results found for: PHART, PO2ART, JSG2EEQ, LWM0KOW, BEART, C6ALRWIZ   Type & Screen (If Applicable)  No results found for: LABABO, [address]                         BMI: Wt Readings from Last 3 Encounters:       NPO Status:   Date of last liquid consumption: 10/14/20   Time of last liquid consumption: 2100   Date of last solid food consumption: 10/14/20      Time of last solid consumption: 2100       Anesthesia Evaluation  Patient summary reviewed  Airway: Mallampati: II  TM distance: >3 FB   Neck ROM: full  Mouth opening: > = 3 FB Dental:    (+) edentulous      Pulmonary:       (-) COPD, asthma and shortness of breath                           Cardiovascular:        (-) hypertension, valvular problems/murmurs, past MI, CAD, CABG/stent, dysrhythmias and  angina                Neuro/Psych:   (+) neuromuscular disease:,    (-) seizures, TIA and CVA           GI/Hepatic/Renal:   (+) GERD:,      (-) PUD, liver disease and no renal disease       Endo/Other:        (-) diabetes mellitus               Abdominal:           Vascular: negative vascular ROS. Anesthesia Plan      MAC     ASA 2     (I discussed intravenous sedation to the patient's satisfaction including risks and alternatives. The patient agreed with the plan and has no further questions. MARVIN KONG )  Induction: intravenous. Anesthetic plan and risks discussed with patient. Plan discussed with CRNA. This pre-anesthesia assessment may be used as a history and physical.    DOS STAFF ADDENDUM:    Pt seen and examined, chart reviewed (including anesthesia, drug and allergy history). No interval changes to history and physical examination. Anesthetic plan, risks, benefits, alternatives, and personnel involved discussed with patient. Patient verbalized an understanding and agrees to proceed.       Beryl Davenport MD  October 15, 2020  9:46 AM

## 2020-10-23 ENCOUNTER — OFFICE VISIT (OUTPATIENT)
Dept: ORTHOPEDIC SURGERY | Age: 65
End: 2020-10-23

## 2020-10-23 VITALS — WEIGHT: 189 LBS | HEIGHT: 67 IN | TEMPERATURE: 97.9 F | RESPIRATION RATE: 16 BRPM | BODY MASS INDEX: 29.66 KG/M2

## 2020-10-23 PROBLEM — M65.332 TRIGGER MIDDLE FINGER OF LEFT HAND: Status: RESOLVED | Noted: 2020-09-25 | Resolved: 2020-10-23

## 2020-10-23 PROCEDURE — 99024 POSTOP FOLLOW-UP VISIT: CPT | Performed by: ORTHOPAEDIC SURGERY

## 2020-10-23 NOTE — Clinical Note
Dear  Gael Garza, DO,    Thank you very much for your referral or Mr. Evaristo Starr to me for evaluation and treatment of his Hand & Wrist condition. I appreciate your confidence in me and thank you for allowing me the opportunity to care for your patients. If I can be of any further assistance to you on this or any other patient, please do not hesitate to contact me. Sincerely,    Halima Senior.  Lui President, MD

## 2020-10-23 NOTE — PROGRESS NOTES
Mr. Glory Doty returns today in follow-up of his recent left Middle Finger A1 Pulley (Trigger Finger) Release done approximately 1 week ago. He has done well noting mild discomfort and no other reported complications. He notes pre-operative symptoms to be completely resolved at this time. Physical Exam:  Skin incision is healing well, no significant drainage, no dehiscence, no significant erythema. Digital range of motion is slightly limited in the Middle Finger, normal in all other digits. Wrist range of motion is full and equal bilateral.  Sensation is normal in the Whole Hand. Vascular examination reveals normal, good capillary refill, good color and warm. Swelling is minimal.  His preoperative triggering is completely resolved. Impression:  Mr. Glory Doty is doing well after recent left Middle Finger Trigger Finger Release. Plan:  Mr. Glory Doty is instructed in work on Active & Passive range of motion of the digits, wrist, & elbow. These modalities were specifically demonstrated to him today and he return demonstrated proper understanding. We discussed the appropriateness of gradual resumption of use of the operated hand and the return to normal use as comfort allows. He is given instructions regarding management of the fresh surgical incision and progressive use of desensitization and tissue massage techniques. We discussed the appropriate expectations and timeline for symptom improvement. He is provided a written patient instruction sheet titled: Postoperative Instructions After Trigger Finger Release. I have asked Mr. Glory Doty to follow-up with me or contact me by telephone over the next 4 weeks if his symptoms have not fully resolved or if he has not regained full & painless return of function. He is also specifically instructed to return to the office or call for an appointment sooner if his symptoms are changing or worsening prior to that time.

## 2020-11-06 ENCOUNTER — OFFICE VISIT (OUTPATIENT)
Dept: FAMILY MEDICINE CLINIC | Age: 65
End: 2020-11-06
Payer: MEDICARE

## 2020-11-06 VITALS
HEIGHT: 67 IN | TEMPERATURE: 98.4 F | SYSTOLIC BLOOD PRESSURE: 124 MMHG | DIASTOLIC BLOOD PRESSURE: 72 MMHG | WEIGHT: 196 LBS | BODY MASS INDEX: 30.76 KG/M2

## 2020-11-06 LAB
A/G RATIO: 1.4 (ref 1.1–2.2)
ALBUMIN SERPL-MCNC: 4.4 G/DL (ref 3.4–5)
ALP BLD-CCNC: 59 U/L (ref 40–129)
ALT SERPL-CCNC: 26 U/L (ref 10–40)
ANION GAP SERPL CALCULATED.3IONS-SCNC: 11 MMOL/L (ref 3–16)
AST SERPL-CCNC: 23 U/L (ref 15–37)
BASOPHILS ABSOLUTE: 0.1 K/UL (ref 0–0.2)
BASOPHILS RELATIVE PERCENT: 0.8 %
BILIRUB SERPL-MCNC: 0.5 MG/DL (ref 0–1)
BUN BLDV-MCNC: 21 MG/DL (ref 7–20)
CALCIUM SERPL-MCNC: 9.3 MG/DL (ref 8.3–10.6)
CHLORIDE BLD-SCNC: 103 MMOL/L (ref 99–110)
CHOLESTEROL, TOTAL: 209 MG/DL (ref 0–199)
CO2: 23 MMOL/L (ref 21–32)
CREAT SERPL-MCNC: 0.9 MG/DL (ref 0.8–1.3)
EOSINOPHILS ABSOLUTE: 0.2 K/UL (ref 0–0.6)
EOSINOPHILS RELATIVE PERCENT: 2.9 %
GFR AFRICAN AMERICAN: >60
GFR NON-AFRICAN AMERICAN: >60
GLOBULIN: 3.2 G/DL
GLUCOSE BLD-MCNC: 106 MG/DL (ref 70–99)
HCT VFR BLD CALC: 44.7 % (ref 40.5–52.5)
HDLC SERPL-MCNC: 42 MG/DL (ref 40–60)
HEMOGLOBIN: 15.2 G/DL (ref 13.5–17.5)
HEPATITIS C ANTIBODY INTERPRETATION: NORMAL
LDL CHOLESTEROL CALCULATED: 149 MG/DL
LYMPHOCYTES ABSOLUTE: 2.9 K/UL (ref 1–5.1)
LYMPHOCYTES RELATIVE PERCENT: 43.8 %
MCH RBC QN AUTO: 30.8 PG (ref 26–34)
MCHC RBC AUTO-ENTMCNC: 34 G/DL (ref 31–36)
MCV RBC AUTO: 90.7 FL (ref 80–100)
MONOCYTES ABSOLUTE: 0.6 K/UL (ref 0–1.3)
MONOCYTES RELATIVE PERCENT: 9.5 %
NEUTROPHILS ABSOLUTE: 2.9 K/UL (ref 1.7–7.7)
NEUTROPHILS RELATIVE PERCENT: 43 %
PDW BLD-RTO: 12.5 % (ref 12.4–15.4)
PLATELET # BLD: 301 K/UL (ref 135–450)
PMV BLD AUTO: 8 FL (ref 5–10.5)
POTASSIUM SERPL-SCNC: 4.5 MMOL/L (ref 3.5–5.1)
PROSTATE SPECIFIC ANTIGEN: 1 NG/ML (ref 0–4)
RBC # BLD: 4.93 M/UL (ref 4.2–5.9)
SODIUM BLD-SCNC: 137 MMOL/L (ref 136–145)
TOTAL PROTEIN: 7.6 G/DL (ref 6.4–8.2)
TRIGL SERPL-MCNC: 89 MG/DL (ref 0–150)
TSH REFLEX FT4: 1.73 UIU/ML (ref 0.27–4.2)
VLDLC SERPL CALC-MCNC: 18 MG/DL
WBC # BLD: 6.7 K/UL (ref 4–11)

## 2020-11-06 PROCEDURE — G8417 CALC BMI ABV UP PARAM F/U: HCPCS | Performed by: FAMILY MEDICINE

## 2020-11-06 PROCEDURE — G8484 FLU IMMUNIZE NO ADMIN: HCPCS | Performed by: FAMILY MEDICINE

## 2020-11-06 PROCEDURE — 3017F COLORECTAL CA SCREEN DOC REV: CPT | Performed by: FAMILY MEDICINE

## 2020-11-06 PROCEDURE — 36415 COLL VENOUS BLD VENIPUNCTURE: CPT | Performed by: FAMILY MEDICINE

## 2020-11-06 PROCEDURE — 1036F TOBACCO NON-USER: CPT | Performed by: FAMILY MEDICINE

## 2020-11-06 PROCEDURE — 99204 OFFICE O/P NEW MOD 45 MIN: CPT | Performed by: FAMILY MEDICINE

## 2020-11-06 PROCEDURE — 1123F ACP DISCUSS/DSCN MKR DOCD: CPT | Performed by: FAMILY MEDICINE

## 2020-11-06 PROCEDURE — G8427 DOCREV CUR MEDS BY ELIG CLIN: HCPCS | Performed by: FAMILY MEDICINE

## 2020-11-06 PROCEDURE — 4040F PNEUMOC VAC/ADMIN/RCVD: CPT | Performed by: FAMILY MEDICINE

## 2020-11-06 RX ORDER — HYDROXYZINE HYDROCHLORIDE 25 MG/1
25 TABLET, FILM COATED ORAL 3 TIMES DAILY PRN
Qty: 90 TABLET | Refills: 1 | Status: SHIPPED | OUTPATIENT
Start: 2020-11-06 | End: 2021-03-09 | Stop reason: SDUPTHER

## 2020-11-06 ASSESSMENT — PATIENT HEALTH QUESTIONNAIRE - PHQ9
SUM OF ALL RESPONSES TO PHQ QUESTIONS 1-9: 0
SUM OF ALL RESPONSES TO PHQ QUESTIONS 1-9: 0
2. FEELING DOWN, DEPRESSED OR HOPELESS: 0
SUM OF ALL RESPONSES TO PHQ QUESTIONS 1-9: 0
SUM OF ALL RESPONSES TO PHQ9 QUESTIONS 1 & 2: 0
1. LITTLE INTEREST OR PLEASURE IN DOING THINGS: 0

## 2020-11-06 NOTE — PROGRESS NOTES
2020    Nakul Carty (:  1955) is a 77 y.o. male, here for evaluation of the following medical concerns:    HPI    1. Establish care- patient presented to establish care with pcp, patient is feeling well today and doesn't have a new complaint. Patient denied tobacco use quit at 34years of age, no alcohol use, denied drug use. Patient has several chronic medication conditions. Patient doesn't have a pcp and was seeing Dr. Amanda Stewart in [facility]? 2. Hypothyroidism- on medication for over one year, patient had had this checked for sometime, is on 125 mcg, tolerates well, needs this checked today, denied fatigue, tremors etc..    3.  GERD- patient is well controlled on Pepcid, no side effects tolerates medication well. 4. Erectile dysfunction- patient has been on Cialis for sometime, over a year, no side effects with the medication. 5. Screening- patient is in need of a number of screening labs and procedures. These will be ordered at this visit. Today, chest pain, sob, n, v,or diarrhea. Review of Systems   Constitutional: Negative for activity change, fatigue, fever and unexpected weight change. HENT: Negative for congestion, ear pain, hearing loss, rhinorrhea, sinus pressure, sore throat and trouble swallowing. Eyes: Negative for discharge and visual disturbance. Respiratory: Negative for cough, chest tightness, shortness of breath and wheezing. Cardiovascular: Negative for chest pain, palpitations and leg swelling. Gastrointestinal: Negative for abdominal pain, anal bleeding, diarrhea, nausea and vomiting. Endocrine: Negative for cold intolerance, heat intolerance, polydipsia and polyphagia. Genitourinary: Negative for decreased urine volume, discharge, dysuria, frequency, genital sores, penile pain, testicular pain and urgency. Musculoskeletal: Negative for arthralgias. Skin: Negative for rash.    Neurological: Negative for dizziness, syncope, light-headedness and headaches. Hematological: Does not bruise/bleed easily. Psychiatric/Behavioral: Negative for dysphoric mood and suicidal ideas. The patient is not nervous/anxious. Prior to Visit Medications    Medication Sig Taking?  Authorizing Provider   hydrOXYzine (ATARAX) 25 MG tablet Take 1 tablet by mouth 3 times daily as needed for Itching Yes Ethan Perez DO   famotidine (PEPCID) 40 MG tablet Take 1 tablet by mouth 2 times daily Yes Historical Provider, MD   EUTHYROX 125 MCG tablet Take 1 tablet by mouth daily Yes Historical Provider, MD   ibuprofen (ADVIL;MOTRIN) 200 MG tablet Take 200-400 mg by mouth every 6 hours as needed for Pain Yes Historical Provider, MD   tadalafil (CIALIS) 20 MG tablet Take 20 mg by mouth as needed for Erectile Dysfunction Yes Historical Provider, MD        No Known Allergies    Past Medical History:   Diagnosis Date    Erectile dysfunction     [place] (hard of hearing)     Hypothyroidism     Reflux     Thyroid disease     Wears hearing aid     bilateral       Past Surgical History:   Procedure Laterality Date    CARPAL TUNNEL RELEASE Right 11/26/13    EYE SURGERY Bilateral     Lasik    FINGER TRIGGER RELEASE Left 10/15/2020    LEFT MIDDLE FINGER TRIGGER FINGER RELEASE performed by Oma Burks MD at [address] [place] [address] History     Socioeconomic History    Marital status:      Spouse name: Not on file    Number of children: Not on file    Years of education: Not on file    Highest education level: Not on file   Occupational History    Occupation:      Employer: [employer] resource strain: Not on file    Food insecurity     Worry: Not on file     Inability: Not on file   Major Aide needs     Medical: Not on file     Non-medical: Not on file   Tobacco Use    Smoking status: Former Smoker     Types: Cigarettes    Smokeless tobacco: Never Used    Tobacco comment: quit smoking over 20 years ago   Substance and Date    LABA1C 6.1 04/08/2013     No components found for: CHLPL  Lab Results   Component Value Date    TRIG 89 11/06/2020     Lab Results   Component Value Date    HDL 42 11/06/2020     Lab Results   Component Value Date    LDLCALC 149 (H) 11/06/2020     Lab Results   Component Value Date    LABVLDL 18 11/06/2020       Physical Exam  Vitals signs and nursing note reviewed. Constitutional:       Appearance: He is well-developed. HENT:      Head: Normocephalic and atraumatic. Right Ear: Tympanic membrane, ear canal and external ear normal.      Left Ear: Tympanic membrane, ear canal and external ear normal.      Nose: Nose normal.      Mouth/Throat:      Mouth: Mucous membranes are moist.   Eyes:      Conjunctiva/sclera: Conjunctivae normal.   Neck:      Thyroid: No thyromegaly. Cardiovascular:      Rate and Rhythm: Normal rate. Heart sounds: No murmur. Pulmonary:      Effort: Pulmonary effort is normal.      Breath sounds: Normal breath sounds. No wheezing. Abdominal:      General: Bowel sounds are normal.      Palpations: Abdomen is soft. Tenderness: There is no abdominal tenderness. Musculoskeletal: Normal range of motion. Lymphadenopathy:      Cervical: No cervical adenopathy. Skin:     Findings: No rash. Neurological:      Mental Status: He is alert and oriented to person, place, and time. Cranial Nerves: No cranial nerve deficit. Psychiatric:         Behavior: Behavior normal.         Judgment: Judgment normal.         ASSESSMENT/PLAN:  1. Encounter to establish care  Care established  Medications reviewed  Questions answered  Labs obtained  RTC in three months for follow up. - Hepatitis C Antibody  - Lipid Panel  - CBC Auto Differential  - Comprehensive Metabolic Panel  - TSH WITH REFLEX TO FT4  - AFL - Bindu Chahal MD, Gastroenterology, [facility]    2. Gastroesophageal reflux disease without esophagitis  Take medication as prescribed. Raise head of bed. Avoid trigger foods. Discussed side effects of the medication. Please RTC if not improved. - Hepatitis C Antibody  - Lipid Panel  - CBC Auto Differential  - Comprehensive Metabolic Panel  - TSH WITH REFLEX TO ORQUIDEA4  - MILANA Zamarripa MD, Gastroenterology, [facility]    3. Thyroid disease  Stable  Continue with medication  Lab obtained today   Answered questions  - Hepatitis C Antibody  - Lipid Panel  - CBC Auto Differential  - Comprehensive Metabolic Panel  - TSH WITH REFLEX TO FT4  Todd Zamarripa MD, Gastroenterology, [facility]    4. Erectile dysfunction, unspecified erectile dysfunction type  Stable  Continue with medication  Answered questions  - Hepatitis C Antibody  - Lipid Panel  - CBC Auto Differential  - Comprehensive Metabolic Panel  - TSH WITH REFLEX TO FT4  - MILANA Zamarripa MD, Gastroenterology, [facility]    5. Colon cancer screening  Referred for procedure  Educated on the importance of having this done. Answered questions  - Hepatitis C Antibody  - Lipid Panel  - CBC Auto Differential  - Comprehensive Metabolic Panel  - TSH WITH REFLEX TO KYM Zamarripa MD, Gastroenterology, [facility]    6. Encounter for abdominal aortic aneurysm (AAA) screening  . Referred for procedure  Educated on the importance of having this done. Answered questions  - Hepatitis C Antibody  - Lipid Panel  - CBC Auto Differential  - Comprehensive Metabolic Panel  - TSH WITH REFLEX TO FT4  - MILANA Zamarripa MD, Gastroenterology, [facility]    7. Need for hepatitis C screening test  Lab obtained  Educated on the importance of having this done. Answered questions  - Hepatitis C Antibody  - Lipid Panel  - CBC Auto Differential  - Comprehensive Metabolic Panel  - TSH WITH REFLEX TO FT4  - MILANA Zamarripa MD, Gastroenterology, [facility]    8.  Class 1 obesity with serious comorbidity and body mass index (BMI) of 30.0 to 30.9 in adult, unspecified obesity type  Discussed the need to exercise 30 minutes each day. Monitor diet and push water. Reduce refined carbs and replace with fiber and vegetables. Decrease portion size and limit snacking, stop eating after dinner  Count calories. - Hepatitis C Antibody  - Lipid Panel  - CBC Auto Differential  - Comprehensive Metabolic Panel  - TSH WITH REFLEX TO FT4  - AFL - Radha Lawson MD, Gastroenterology, [facility]    9. History of tobacco use  Patient has quit  Understands the need to not smoke. - Hepatitis C Antibody  - Lipid Panel  - CBC Auto Differential  - Comprehensive Metabolic Panel  - TSH WITH REFLEX TO FT4  - AFL - Radha Lawson MD, Gastroenterology, [address] [place] [address]; Future    10. Screening PSA (prostate specific antigen)  Lab obtained  Educated on the importance of having this done. Answered questions  - Psa screening    11. Need for influenza vaccination  Vaccination given  Educated on the importance of having this done. Answered questions  - INFLUENZA, QUADV, ADJUVANTED, 65 YRS =, IM, PF, PREFILL SYR, 0.5ML (FLUAD)      Return in about 3 months (around 2/6/2021).

## 2020-11-06 NOTE — PROGRESS NOTES
Vaccine Information Sheet, \"Influenza - Inactivated\"  given to Endy Stanton, or parent/legal guardian of  Endy Stanton and verbalized understanding. Patient responses:    Have you ever had a reaction to a flu vaccine? No  Do you have any current illness? No  Have you ever had Guillian Chadwicks Syndrome? No  Do you have a serious allergy to any of the follow: Neomycin, Polymyxin, Thimerosal, eggs or egg products? No    Flu vaccine given per order. Please see immunization tab. Risks and benefits explained. Current VIS given.

## 2020-11-07 PROCEDURE — 90694 VACC AIIV4 NO PRSRV 0.5ML IM: CPT | Performed by: FAMILY MEDICINE

## 2020-11-07 PROCEDURE — G0008 ADMIN INFLUENZA VIRUS VAC: HCPCS | Performed by: FAMILY MEDICINE

## 2020-11-07 ASSESSMENT — ENCOUNTER SYMPTOMS
SHORTNESS OF BREATH: 0
NAUSEA: 0
SINUS PRESSURE: 0
EYE DISCHARGE: 0
SORE THROAT: 0
VOMITING: 0
CHEST TIGHTNESS: 0
DIARRHEA: 0
ABDOMINAL PAIN: 0
ANAL BLEEDING: 0
RHINORRHEA: 0
TROUBLE SWALLOWING: 0
COUGH: 0
WHEEZING: 0

## 2020-11-16 ENCOUNTER — HOSPITAL ENCOUNTER (OUTPATIENT)
Dept: ULTRASOUND IMAGING | Age: 65
Discharge: HOME | End: 2020-11-16
Payer: MEDICARE

## 2020-11-16 PROCEDURE — 76775 US EXAM ABDO BACK WALL LIM: CPT

## 2020-11-18 ENCOUNTER — OFFICE VISIT (OUTPATIENT)
Dept: PRIMARY CARE CLINIC | Age: 65
End: 2020-11-18
Payer: MEDICARE

## 2020-11-18 PROCEDURE — 99211 OFF/OP EST MAY X REQ PHY/QHP: CPT | Performed by: NURSE PRACTITIONER

## 2020-11-18 NOTE — PROGRESS NOTES
Patient presented to [facility] drive up clinic for preop testing. Patient was swabbed and given information advising them to remain isolated until procedure date.

## 2020-11-19 LAB — SARS-COV-2: DETECTED

## 2020-11-20 ENCOUNTER — TELEPHONE (OUTPATIENT)
Dept: FAMILY MEDICINE CLINIC | Age: 65
End: 2020-11-20

## 2020-11-20 NOTE — TELEPHONE ENCOUNTER
Pt saw in My Chart that he is positive for COVID. He was tested prior to getting a colonoscopy with Dr Cony Flores. He is currently asymptomatic, but I advised him to quarantine for 10 days per CDC guidelines. Any further guidance?

## 2020-12-11 RX ORDER — FAMOTIDINE 40 MG/1
20 TABLET, FILM COATED ORAL 2 TIMES DAILY
Qty: 30 TABLET | Refills: 0 | Status: SHIPPED | OUTPATIENT
Start: 2020-12-11 | End: 2020-12-17 | Stop reason: SDUPTHER

## 2020-12-16 ENCOUNTER — PATIENT MESSAGE (OUTPATIENT)
Dept: FAMILY MEDICINE CLINIC | Age: 65
End: 2020-12-16

## 2020-12-17 RX ORDER — FAMOTIDINE 40 MG/1
20 TABLET, FILM COATED ORAL 2 TIMES DAILY
Qty: 30 TABLET | Refills: 0 | Status: SHIPPED | OUTPATIENT
Start: 2020-12-17 | End: 2021-01-09 | Stop reason: SDUPTHER

## 2020-12-28 ENCOUNTER — ANESTHESIA EVENT (OUTPATIENT)
Dept: ENDOSCOPY | Age: 65
End: 2020-12-28
Payer: MEDICARE

## 2020-12-28 NOTE — PROGRESS NOTES
[address] [facility] time___12/29/2020 1130_________        Surgery time_____1230_______    Take the following medications with a sip of water:    Do not eat or drink anything after 12:00 midnight prior to your surgery. This includes water chewing gum, mints and ice chips. You may brush your teeth and gargle the morning of your surgery, but do not swallow the water     Please see your family doctor/pediatrician for a history and physical and/or concerning medications. Bring any test results/reports from your physicians office. If you are under the care of a heart doctor or specialist doctor, please be aware that you may be asked to them for clearance    You may be asked to stop blood thinners such as Coumadin, Plavix, Fragmin, Lovenox, etc., or any anti-inflammatories such as:  Aspirin, Ibuprofen, Advil, Naproxen prior to your surgery. We also ask that you stop any OTC medications such as fish oil, vitamin E, glucosamine, garlic, Multivitamins, COQ 10, etc.    We ask that you do not smoke 24 hours prior to surgery  We ask that you do not  drink any alcoholic beverages 24 hours prior to surgery     You must make arrangements for a responsible adult to take you home after your surgery. For your safety you will not be allowed to leave alone or drive yourself home. Your surgery will be cancelled if you do not have a ride home. Also for your safety, it is strongly suggested that someone stay with you the first 24 hours after your surgery. A parent or legal guardian must accompany a child scheduled for surgery and plan to stay at the hospital until the child is discharged. Please do not bring other children with you. For your comfort, please wear simple loose fitting clothing to the hospital.  Please do not bring valuables.     Do not wear any make-up or nail polish on your fingers or toes For your safety, please do not wear any jewelry or body piercing's on the day of surgery. All jewelry must be removed. If you have dentures, they will be removed before going to operating room. For your convenience, we will provide you with a container. If you wear contact lenses or glasses, they will be removed, please bring a case for them. If you have a living will and a durable power of  for healthcare, please bring in a copy. As part of our patient safety program to minimize surgical site infections, we ask you to do the following:    · Please notify your surgeon if you develop any illness between         now and the  day of your surgery. · This includes a cough, cold, fever, sore throat, nausea,         or vomiting, and diarrhea, etc.  ·  Please notify your surgeon if you experience dizziness, shortness         of breath or blurred vision between now and the time of your surgery. Do not shave your operative site 96 hours prior to surgery. For face and neck surgery, men may use an electric razor 48 hours   prior to surgery. You may shower the night before surgery or the morning of   your surgery with an antibacterial soap. You will need to bring a photo ID and insurance card    [facility] has an onsite pharmacy, would you like to utilize our pharmacy     If you will be staying overnight and use a C-pap machine, please bring   your C-pap to hospital     Our goal is to provide you with excellent care, therefore, visitors will be limited to two(2) in the room at a time so that we may focus on providing this care for you. Please contact pre-admission testing if you have any further questions. [facility] phone number:  [phone]  Please note these are generalized instructions for all surgical cases, you may be provided with more specific instructions according to your surgery.

## 2020-12-29 ENCOUNTER — ANESTHESIA (OUTPATIENT)
Dept: ENDOSCOPY | Age: 65
End: 2020-12-29
Payer: MEDICARE

## 2020-12-29 ENCOUNTER — HOSPITAL ENCOUNTER (OUTPATIENT)
Age: 65
Setting detail: OUTPATIENT SURGERY
Discharge: HOME | End: 2020-12-29
Attending: INTERNAL MEDICINE | Admitting: INTERNAL MEDICINE
Payer: MEDICARE

## 2020-12-29 VITALS
DIASTOLIC BLOOD PRESSURE: 92 MMHG | TEMPERATURE: 96.7 F | HEART RATE: 51 BPM | SYSTOLIC BLOOD PRESSURE: 140 MMHG | HEIGHT: 67 IN | WEIGHT: 188.8 LBS | OXYGEN SATURATION: 98 % | BODY MASS INDEX: 29.63 KG/M2 | RESPIRATION RATE: 14 BRPM

## 2020-12-29 VITALS — DIASTOLIC BLOOD PRESSURE: 104 MMHG | SYSTOLIC BLOOD PRESSURE: 143 MMHG | OXYGEN SATURATION: 96 %

## 2020-12-29 PROCEDURE — 7100000010 HC PHASE II RECOVERY - FIRST 15 MIN: Performed by: INTERNAL MEDICINE

## 2020-12-29 PROCEDURE — 3609027000 HC COLONOSCOPY: Performed by: INTERNAL MEDICINE

## 2020-12-29 PROCEDURE — 7100000011 HC PHASE II RECOVERY - ADDTL 15 MIN: Performed by: INTERNAL MEDICINE

## 2020-12-29 PROCEDURE — 2500000003 HC RX 250 WO HCPCS: Performed by: NURSE ANESTHETIST, CERTIFIED REGISTERED

## 2020-12-29 PROCEDURE — 6360000002 HC RX W HCPCS: Performed by: NURSE ANESTHETIST, CERTIFIED REGISTERED

## 2020-12-29 PROCEDURE — 2580000003 HC RX 258: Performed by: ANESTHESIOLOGY

## 2020-12-29 PROCEDURE — 3700000000 HC ANESTHESIA ATTENDED CARE: Performed by: INTERNAL MEDICINE

## 2020-12-29 PROCEDURE — 3700000001 HC ADD 15 MINUTES (ANESTHESIA): Performed by: INTERNAL MEDICINE

## 2020-12-29 RX ORDER — PROPOFOL 10 MG/ML
INJECTION, EMULSION INTRAVENOUS PRN
Status: DISCONTINUED | OUTPATIENT
Start: 2020-12-29 | End: 2020-12-29 | Stop reason: SDUPTHER

## 2020-12-29 RX ORDER — SODIUM CHLORIDE 9 MG/ML
INJECTION, SOLUTION INTRAVENOUS CONTINUOUS
Status: DISCONTINUED | OUTPATIENT
Start: 2020-12-29 | End: 2020-12-29 | Stop reason: HOSPADM

## 2020-12-29 RX ORDER — SODIUM CHLORIDE 0.9 % (FLUSH) 0.9 %
10 SYRINGE (ML) INJECTION EVERY 12 HOURS SCHEDULED
Status: DISCONTINUED | OUTPATIENT
Start: 2020-12-29 | End: 2020-12-29 | Stop reason: HOSPADM

## 2020-12-29 RX ORDER — ONDANSETRON 2 MG/ML
4 INJECTION INTRAMUSCULAR; INTRAVENOUS
Status: DISCONTINUED | OUTPATIENT
Start: 2020-12-29 | End: 2020-12-29 | Stop reason: HOSPADM

## 2020-12-29 RX ORDER — LIDOCAINE HYDROCHLORIDE 20 MG/ML
INJECTION, SOLUTION EPIDURAL; INFILTRATION; INTRACAUDAL; PERINEURAL PRN
Status: DISCONTINUED | OUTPATIENT
Start: 2020-12-29 | End: 2020-12-29 | Stop reason: SDUPTHER

## 2020-12-29 RX ORDER — SODIUM CHLORIDE 0.9 % (FLUSH) 0.9 %
10 SYRINGE (ML) INJECTION PRN
Status: DISCONTINUED | OUTPATIENT
Start: 2020-12-29 | End: 2020-12-29 | Stop reason: HOSPADM

## 2020-12-29 RX ADMIN — PROPOFOL 50 MG: 10 INJECTION, EMULSION INTRAVENOUS at 11:27

## 2020-12-29 RX ADMIN — PROPOFOL 120 MG: 10 INJECTION, EMULSION INTRAVENOUS at 11:19

## 2020-12-29 RX ADMIN — LIDOCAINE HYDROCHLORIDE 100 MG: 20 INJECTION, SOLUTION EPIDURAL; INFILTRATION; INTRACAUDAL; PERINEURAL at 11:19

## 2020-12-29 RX ADMIN — SODIUM CHLORIDE: 9 INJECTION, SOLUTION INTRAVENOUS at 10:53

## 2020-12-29 RX ADMIN — SODIUM CHLORIDE: 9 INJECTION, SOLUTION INTRAVENOUS at 11:10

## 2020-12-29 RX ADMIN — PROPOFOL 50 MG: 10 INJECTION, EMULSION INTRAVENOUS at 11:25

## 2020-12-29 RX ADMIN — PROPOFOL 50 MG: 10 INJECTION, EMULSION INTRAVENOUS at 11:22

## 2020-12-29 ASSESSMENT — PAIN SCALES - WONG BAKER
WONGBAKER_NUMERICALRESPONSE: 0
WONGBAKER_NUMERICALRESPONSE: 0

## 2020-12-29 ASSESSMENT — PAIN SCALES - GENERAL
PAINLEVEL_OUTOF10: 0

## 2020-12-29 ASSESSMENT — PAIN - FUNCTIONAL ASSESSMENT: PAIN_FUNCTIONAL_ASSESSMENT: 0-10

## 2020-12-29 NOTE — ANESTHESIA POSTPROCEDURE EVALUATION
Department of Anesthesiology  Postprocedure Note    Patient: Michelle Bello  MRN: [id number]  YOB: 1955  Date of evaluation: 12/29/2020  Time:  1:05 PM     Procedure Summary     Date: 12/29/20 Room / Location: [address] [place] [address]    Anesthesia Start: 7504 Anesthesia Stop: 1132    Procedure: COLORECTAL CANCER SCREENING, NOT HIGH RISK (N/A ) Diagnosis:       Colon cancer screening      (Screening)    Surgeons: Adams Earl MD Responsible Provider: Renee Barragan MD    Anesthesia Type: MAC ASA Status: 2          Anesthesia Type: MAC    Re Phase I: Re Score: 10    Re Phase II: Re Score: 10    Last vitals: Reviewed and per EMR flowsheets.        Anesthesia Post Evaluation    Patient location during evaluation: bedside  Patient participation: complete - patient participated  Level of consciousness: awake  Pain score: 0  Airway patency: patent  Nausea & Vomiting: no nausea and no vomiting  Complications: no  Cardiovascular status: blood pressure returned to baseline  Respiratory status: acceptable  Hydration status: euvolemic

## 2020-12-29 NOTE — H&P
[facility]   Pre-operative History and Physical    Patient: Nakul Carty  : 1955  Acct#:         HISTORY OF PRESENT ILLNESS:    The patient is a 77 y.o. male  who presents with colon cancer screening  Past Medical History:        Diagnosis Date    Erectile dysfunction     [place] (hard of hearing)     Hyperlipidemia     Hypothyroidism     Reflux     Thyroid disease     Wears hearing aid     bilateral     Past Surgical History:        Procedure Laterality Date    CARPAL TUNNEL RELEASE Right 13    EYE SURGERY Bilateral     Lasik    FINGER TRIGGER RELEASE Left 10/15/2020    LEFT MIDDLE FINGER TRIGGER FINGER RELEASE performed by Gissell Ragland MD at [place] [address]     Medications prior to admission:   Prior to Admission medications    Medication Sig Start Date End Date Taking? Authorizing Provider   famotidine (PEPCID) 40 MG tablet Take 0.5 tablets by mouth 2 times daily 20  Yes Brodie Homans, DO   levothyroxine (SYNTHROID) 100 MCG injection Infuse 50 mcg intravenously Daily   Yes Historical Provider, MD   hydrOXYzine (ATARAX) 25 MG tablet Take 1 tablet by mouth 3 times daily as needed for Itching 20  Yes Ethan Perez,    ibuprofen (ADVIL;MOTRIN) 200 MG tablet Take 200-400 mg by mouth every 6 hours as needed for Pain   Yes Historical Provider, MD   tadalafil (CIALIS) 20 MG tablet Take 20 mg by mouth as needed for Erectile Dysfunction    Historical Provider, MD     Allergies:    Patient has no known allergies.   Social History:   Social History     Socioeconomic History    Marital status:      Spouse name: Not on file    Number of children: Not on file    Years of education: Not on file    Highest education level: Not on file   Occupational History    Occupation:      Employer: [employer] Financial resource strain: Not on file    Food insecurity     Worry: Not on file     Inability: Not on file   [employer] needs Medical: Not on file     Non-medical: Not on file   Tobacco Use    Smoking status: Former Smoker     Types: Cigarettes    Smokeless tobacco: Never Used    Tobacco comment: quit smoking over 20 years ago   Substance and Sexual Activity    Alcohol use: Not Currently     Comment: social    Drug use: Never    Sexual activity: Yes     Partners: Female   Lifestyle    Physical activity     Days per week: Not on file     Minutes per session: Not on file    Stress: Not on file   Relationships    Social connections     Talks on phone: Not on file     Gets together: Not on file     Attends Restorationist service: Not on file     Active member of club or organization: Not on file     Attends meetings of clubs or organizations: Not on file     Relationship status: Not on file    Intimate partner violence     Fear of current or ex partner: Not on file     Emotionally abused: Not on file     Physically abused: Not on file     Forced sexual activity: Not on file   Other Topics Concern    Not on file   Social History Narrative    Not on file           Family History:   Family History   Problem Relation Age of Onset    High Blood Pressure Mother     Stroke Mother     Heart Disease Father     Heart Attack Father     Kidney Disease Sister          PHYSICAL EXAM:      BP (!) 146/85   Pulse 60   Temp 97.6 °F (36.4 °C) (Temporal)   Resp 18   Ht 5' 7\" (1.702 m)   Wt 188 lb 12.8 oz (85.6 kg)   SpO2 100%   BMI 29.57 kg/m²  I        Heart: Normal    Lungs: Normal    Abdomen: Normal      ASA Grade: ASA 2 - Patient with mild systemic disease with no functional limitations    II (soft palate, uvula, fauces visible)  ASSESSMENT AND PLAN:    1. Patient is a 77 y.o. male here for Colonoscopy  2. Procedure options, risks and benefits reviewed with patient who expresses understanding.

## 2020-12-29 NOTE — PROCEDURES
[address] [facility]                               COLONOSCOPY REPORT    PATIENT NAME: Irma Kennedy                   :        1955  MED REC NO:   [id number]                          ROOM:  ACCOUNT NO:   [de-identified]                           ADMIT DATE: 2020  PROVIDER:     Marck Mitchell MD    DATE OF PROCEDURE:  2020    REFERRING PROVIDER:  Asha Perez DO    PATIENT HISTORY:  A 38-year-old male, outpatient. INSTRUMENT USED:  Olympus [id number]-Q180AL. MEDICATIONS OF PROCEDURE:  The patient was premedicated with Diprivan  intravenously as administered by the anesthesiology service. INDICATIONS:  The patient presents for colon cancer screening. DESCRIPTION OF PROCEDURE:  The digital and anal exams were normal.  The  colonoscope was inserted to the cecum. The prep was only fair. A  lesion such as a small polyp or vascular ectasia could have been missed. No mucosal lesions were identified. ESTIMATED BLOOD LOSS:  None. IMPRESSION:  Normal colonoscopy. PLAN:  The patient would be a candidate for a screening colonoscopy in  10 years, health permitting. MATTHEW Espinoza MD    D: 2020 11:48:03       T: 2020 13:53:25     [id number]  Job#: [id number]     Doc#: [id number]    CC:  Marck Colón MD

## 2020-12-29 NOTE — OP NOTE
Operative Note      Patient: Columba Stewart  YOB: 1955  MRN: [id number]    Date of Procedure: 12/29/2020    Pre-Op Diagnosis: Screening    Post-Op Diagnosis: Same       Procedure(s):  COLORECTAL CANCER SCREENING, NOT HIGH RISK    Surgeon(s):  Lamont Malone MD    Assistant:   * No surgical staff found *    Anesthesia: Monitor Anesthesia Care    Estimated Blood Loss (mL): None    Complications: None    Specimens:   * No specimens in log *    Implants:  * No implants in log *      Drains: * No LDAs found *    Findings: See dictated report    Detailed Description of Procedure:   See dictated report    Electronically signed by Lamont Malone MD on 12/29/2020 at 11:34 AM

## 2020-12-29 NOTE — ANESTHESIA PRE PROCEDURE
[facility] Department of Anesthesiology  Pre-Anesthesia Evaluation/Consultation       Name:  Deshaun Desouza  : 1955  Age:  77 y.o. MRN:  [id number]  Date: 2020           Surgeon: Surgeon(s):  Trista Zamorano MD    Procedure: Procedure(s):  COLORECTAL CANCER SCREENING, NOT HIGH RISK     No Known Allergies  Patient Active Problem List   Diagnosis    CTS (carpal tunnel syndrome)    GERD (gastroesophageal reflux disease)    Laceration of left middle finger without foreign body without damage to nail    Thyroid disease     Past Medical History:   Diagnosis Date    Erectile dysfunction     [place] (hard of hearing)     Hyperlipidemia     Hypothyroidism     Reflux     Thyroid disease     Wears hearing aid     bilateral     Past Surgical History:   Procedure Laterality Date    CARPAL TUNNEL RELEASE Right 13    EYE SURGERY Bilateral     Lasik    FINGER TRIGGER RELEASE Left 10/15/2020    LEFT MIDDLE FINGER TRIGGER FINGER RELEASE performed by Oma Burks MD at [facility] History     Tobacco Use    Smoking status: Former Smoker     Types: Cigarettes    Smokeless tobacco: Never Used    Tobacco comment: quit smoking over 20 years ago   Substance Use Topics    Alcohol use: Not Currently     Comment: social    Drug use: Never     Medications  No current facility-administered medications on file prior to encounter.       Current Outpatient Medications on File Prior to Encounter   Medication Sig Dispense Refill    famotidine (PEPCID) 40 MG tablet Take 0.5 tablets by mouth 2 times daily 30 tablet 0    levothyroxine (SYNTHROID) 100 MCG injection Infuse 50 mcg intravenously Daily      hydrOXYzine (ATARAX) 25 MG tablet Take 1 tablet by mouth 3 times daily as needed for Itching 90 tablet 1    ibuprofen (ADVIL;MOTRIN) 200 MG tablet Take 200-400 mg by mouth every 6 hours as needed for Pain  tadalafil (CIALIS) 20 MG tablet Take 20 mg by mouth as needed for Erectile Dysfunction       No current facility-administered medications for this encounter. Vital Signs (Current)   Vitals:    12/28/20 1051   Weight: 195 lb (88.5 kg)   Height: 5' 7\" (1.702 m)                                          BP Readings from Last 3 Encounters:   11/06/20 124/72   10/15/20 125/88   10/15/20 (!) 148/91     Vital Signs Statistics (for past 48 hrs)     No data recorded  BP Readings from Last 3 Encounters:   11/06/20 124/72   10/15/20 125/88   10/15/20 (!) 148/91       BMI  Body mass index is 30.54 kg/m². Estimated body mass index is 30.54 kg/m² as calculated from the following:    Height as of this encounter: 5' 7\" (1.702 m). Weight as of this encounter: 195 lb (88.5 kg). CBC   Lab Results   Component Value Date    WBC 6.7 11/06/2020    RBC 4.93 11/06/2020    HGB 15.2 11/06/2020    HCT 44.7 11/06/2020    MCV 90.7 11/06/2020    RDW 12.5 11/06/2020     11/06/2020     CMP    Lab Results   Component Value Date     11/06/2020    K 4.5 11/06/2020     11/06/2020    CO2 23 11/06/2020    BUN 21 11/06/2020    CREATININE 0.9 11/06/2020    GFRAA >60 11/06/2020    GFRAA >60 04/08/2013    AGRATIO 1.4 11/06/2020    LABGLOM >60 11/06/2020    GLUCOSE 106 11/06/2020    PROT 7.6 11/06/2020    CALCIUM 9.3 11/06/2020    BILITOT 0.5 11/06/2020    ALKPHOS 59 11/06/2020    AST 23 11/06/2020    ALT 26 11/06/2020     BMP    Lab Results   Component Value Date     11/06/2020    K 4.5 11/06/2020     11/06/2020    CO2 23 11/06/2020    BUN 21 11/06/2020    CREATININE 0.9 11/06/2020    CALCIUM 9.3 11/06/2020    GFRAA >60 11/06/2020    GFRAA >60 04/08/2013    LABGLOM >60 11/06/2020    GLUCOSE 106 11/06/2020     POCGlucose  No results for input(s): GLUCOSE in the last 72 hours.    Coags  No results found for: PROTIME, INR, APTT  HCG (If Applicable) No results found for: PREGTESTUR, PREGSERUM, HCG, HCGQUANT ABGs No results found for: PHART, PO2ART, ORH4XKQ, BIZ2DQS, BEART, U6SCXBIM   Type & Screen (If Applicable)  No results found for: LABABO, LABRH                         BMI: Wt Readings from Last 3 Encounters:       NPO Status:                          Anesthesia Evaluation  Patient summary reviewed no history of anesthetic complications:   Airway: Mallampati: II        Dental:          Pulmonary:       (-) pneumonia                           Cardiovascular:    (+) hyperlipidemia    (-) pacemaker and hypertension                Neuro/Psych:   (+) neuromuscular disease:,    (-) seizures and CVA           GI/Hepatic/Renal:   (+) GERD:, bowel prep,           Endo/Other:    (+) hypothyroidism::., .    (-) diabetes mellitus, hyperthyroidism               Abdominal:           Vascular: negative vascular ROS. Anesthesia Plan      MAC     ASA 2       Induction: intravenous. Anesthetic plan and risks discussed with patient. Plan discussed with CRNA. Attending anesthesiologist reviewed and agrees with Pre Eval content              This pre-anesthesia assessment may be used as a history and physical.    DOS STAFF ADDENDUM:    Pt seen and examined, chart reviewed (including anesthesia, drug and allergy history). No interval changes to history and physical examination. Anesthetic plan, risks, benefits, alternatives, and personnel involved discussed with patient. Patient verbalized an understanding and agrees to proceed.       Eric Sherman MD  December 29, 2020  10:24 AM

## 2020-12-29 NOTE — BRIEF OP NOTE
Brief Postoperative Note    Dhaval Valentine  YOB: 1955  [id number]      Pre-operative Diagnosis: Screening    Previous Colonoscopy: Yes  When: 15 years ago  Greater than 3 years? Yes      Post-operative Diagnosis: Same    Procedure: Colonoscopy    The preparation was fair.     Anesthesia: MAC    Surgeons/Assistants: Aleksander Mcgee MD    Estimated Blood Loss: None    Complications: None    Specimens: Was Not Obtained    Findings: See dictated report    Electronically signed by Aleksander Mcgee MD on 7/10/2017 at 7:45 AM

## 2021-01-11 RX ORDER — FAMOTIDINE 40 MG/1
20 TABLET, FILM COATED ORAL 2 TIMES DAILY
Qty: 30 TABLET | Refills: 0 | Status: SHIPPED | OUTPATIENT
Start: 2021-01-11 | End: 2021-02-12 | Stop reason: SDUPTHER

## 2021-02-08 ENCOUNTER — OFFICE VISIT (OUTPATIENT)
Dept: FAMILY MEDICINE CLINIC | Age: 66
End: 2021-02-08
Payer: MEDICARE

## 2021-02-08 VITALS
HEIGHT: 67 IN | DIASTOLIC BLOOD PRESSURE: 82 MMHG | WEIGHT: 193.4 LBS | TEMPERATURE: 98 F | BODY MASS INDEX: 30.35 KG/M2 | SYSTOLIC BLOOD PRESSURE: 124 MMHG

## 2021-02-08 DIAGNOSIS — E07.9 THYROID DISEASE: ICD-10-CM

## 2021-02-08 DIAGNOSIS — K21.9 GASTROESOPHAGEAL REFLUX DISEASE WITHOUT ESOPHAGITIS: Primary | Chronic | ICD-10-CM

## 2021-02-08 DIAGNOSIS — E78.2 MIXED HYPERLIPIDEMIA: ICD-10-CM

## 2021-02-08 DIAGNOSIS — Z23 NEED FOR 23-POLYVALENT PNEUMOCOCCAL POLYSACCHARIDE VACCINE: ICD-10-CM

## 2021-02-08 LAB
CHOLESTEROL, TOTAL: 181 MG/DL (ref 0–199)
HDLC SERPL-MCNC: 39 MG/DL (ref 40–60)
LDL CHOLESTEROL CALCULATED: 127 MG/DL
TRIGL SERPL-MCNC: 77 MG/DL (ref 0–150)
VLDLC SERPL CALC-MCNC: 15 MG/DL

## 2021-02-08 PROCEDURE — G0009 ADMIN PNEUMOCOCCAL VACCINE: HCPCS | Performed by: FAMILY MEDICINE

## 2021-02-08 PROCEDURE — G8427 DOCREV CUR MEDS BY ELIG CLIN: HCPCS | Performed by: FAMILY MEDICINE

## 2021-02-08 PROCEDURE — 36415 COLL VENOUS BLD VENIPUNCTURE: CPT | Performed by: FAMILY MEDICINE

## 2021-02-08 PROCEDURE — G8484 FLU IMMUNIZE NO ADMIN: HCPCS | Performed by: FAMILY MEDICINE

## 2021-02-08 PROCEDURE — G8417 CALC BMI ABV UP PARAM F/U: HCPCS | Performed by: FAMILY MEDICINE

## 2021-02-08 PROCEDURE — 3017F COLORECTAL CA SCREEN DOC REV: CPT | Performed by: FAMILY MEDICINE

## 2021-02-08 PROCEDURE — 90732 PPSV23 VACC 2 YRS+ SUBQ/IM: CPT | Performed by: FAMILY MEDICINE

## 2021-02-08 PROCEDURE — 1123F ACP DISCUSS/DSCN MKR DOCD: CPT | Performed by: FAMILY MEDICINE

## 2021-02-08 PROCEDURE — 99214 OFFICE O/P EST MOD 30 MIN: CPT | Performed by: FAMILY MEDICINE

## 2021-02-08 PROCEDURE — 4040F PNEUMOC VAC/ADMIN/RCVD: CPT | Performed by: FAMILY MEDICINE

## 2021-02-08 PROCEDURE — 1036F TOBACCO NON-USER: CPT | Performed by: FAMILY MEDICINE

## 2021-02-08 ASSESSMENT — PATIENT HEALTH QUESTIONNAIRE - PHQ9
SUM OF ALL RESPONSES TO PHQ9 QUESTIONS 1 & 2: 0
SUM OF ALL RESPONSES TO PHQ QUESTIONS 1-9: 0

## 2021-02-08 ASSESSMENT — ENCOUNTER SYMPTOMS
VOMITING: 0
ABDOMINAL PAIN: 0
DIARRHEA: 0
SHORTNESS OF BREATH: 0
COLOR CHANGE: 0
NAUSEA: 0

## 2021-02-08 NOTE — PROGRESS NOTES
2021     Laverne Wagner (:  1955) is a 77 y.o. male, here for evaluation of the following medical concerns:    HPI     1. Hypothyroidism- on medication for over one year, patient had had this checked for sometime, is on 125 mcg, tolerates well, needs this checked today, denied fatigue, tremors etc.. takes atarax as a side effect to the synthroid medication.      2. GERD- patient is well controlled on Pepcid, no side effects tolerates medication well. Reduced his Pepcid to 40 mg at night, down from 40 mg BID.     3. Screening- patient is in need of a number of screening labs and procedures. He needs his pneumonia vaccination today. 4.. hyperlipididemia- patient was supposed to diet and exercise in an attempt to lose weight, he has lost 3 pounds,  patient will have lipids checked today. History of high cholesterol, father passed away or CAD.      Today, chest pain, sob, n, v,or diarrhea. Review of Systems   Constitutional: Negative for activity change, fatigue, fever and unexpected weight change. Respiratory: Negative for shortness of breath. Cardiovascular: Negative for chest pain, palpitations and leg swelling. Gastrointestinal: Negative for abdominal pain, diarrhea, nausea and vomiting. Musculoskeletal: Positive for arthralgias. Skin: Negative for color change and wound. Psychiatric/Behavioral: Negative for dysphoric mood. The patient is not nervous/anxious. Prior to Visit Medications    Medication Sig Taking?  Authorizing Provider   famotidine (PEPCID) 40 MG tablet Take 0.5 tablets by mouth 2 times daily Yes Rocky Cagle DO   levothyroxine (SYNTHROID) 100 MCG injection Infuse 50 mcg intravenously Daily Yes Historical Provider, MD   hydrOXYzine (ATARAX) 25 MG tablet Take 1 tablet by mouth 3 times daily as needed for Itching Yes Rocky Cagle, DO ibuprofen (ADVIL;MOTRIN) 200 MG tablet Take 200-400 mg by mouth every 6 hours as needed for Pain Yes Historical Provider, MD   tadalafil (CIALIS) 20 MG tablet Take 20 mg by mouth as needed for Erectile Dysfunction Yes Historical Provider, MD        Social History     Tobacco Use    Smoking status: Former Smoker     Types: Cigarettes    Smokeless tobacco: Never Used    Tobacco comment: quit smoking over 20 years ago   Substance Use Topics    Alcohol use: Not Currently     Comment: social        Vitals:    02/08/21 1011   BP: 124/82   Temp: 98 °F (36.7 °C)   Weight: 193 lb 6.4 oz (87.7 kg)   Height: 5' 7\" (1.702 m)     Estimated body mass index is 30.29 kg/m² as calculated from the following:    Height as of this encounter: 5' 7\" (1.702 m). Weight as of this encounter: 193 lb 6.4 oz (87.7 kg). Physical Exam  Vitals signs and nursing note reviewed. Constitutional:       Appearance: He is well-developed. HENT:      Head: Normocephalic and atraumatic. Right Ear: External ear normal.      Left Ear: External ear normal.   Neck:      Thyroid: No thyromegaly. Cardiovascular:      Rate and Rhythm: Normal rate and regular rhythm. Heart sounds: No murmur. Pulmonary:      Effort: Pulmonary effort is normal.      Breath sounds: Normal breath sounds. No wheezing or rales. Abdominal:      General: Bowel sounds are normal.      Palpations: Abdomen is soft. Tenderness: There is no abdominal tenderness. Musculoskeletal: Normal range of motion. Skin:     Findings: No rash. Neurological:      Mental Status: He is alert and oriented to person, place, and time. Deep Tendon Reflexes: Reflexes normal.   Psychiatric:         Behavior: Behavior normal.         ASSESSMENT/PLAN:  1. Mixed hyperlipidemia  Check lipids profile today  Discussed the need to exercise 30 minutes each day. Monitor diet, avoid fried foods etc... Educated on need to reduce cholesterol in diet and results of poor diet. - Lipid Panel    2. Gastroesophageal reflux disease without esophagitis  Take medication as prescribed. Raise head of bed. Avoid trigger foods. Discussed side effects of the medication. Please RTC if not improved. 3. Thyroid disease  Stable  Continue with medication  Answered questions    4. Need for 23-polyvalent pneumococcal polysaccharide vaccine  Referred for procedure  Educated on the importance of having this done. Answered questions  - Pneumococcal polysaccharide vaccine 23-valent greater than or equal to 3yo subcutaneous/IM      Return in about 6 months (around 8/8/2021).

## 2021-02-12 ENCOUNTER — TELEPHONE (OUTPATIENT)
Dept: FAMILY MEDICINE CLINIC | Age: 66
End: 2021-02-12

## 2021-02-12 RX ORDER — FAMOTIDINE 40 MG/1
20 TABLET, FILM COATED ORAL 2 TIMES DAILY
Qty: 90 TABLET | Refills: 0 | Status: SHIPPED | OUTPATIENT
Start: 2021-02-12 | End: 2021-05-07 | Stop reason: SDUPTHER

## 2021-03-08 ENCOUNTER — PATIENT MESSAGE (OUTPATIENT)
Dept: FAMILY MEDICINE CLINIC | Age: 66
End: 2021-03-08

## 2021-03-09 RX ORDER — HYDROXYZINE HYDROCHLORIDE 25 MG/1
25 TABLET, FILM COATED ORAL 3 TIMES DAILY PRN
Qty: 270 TABLET | Refills: 1 | Status: SHIPPED | OUTPATIENT
Start: 2021-03-09 | End: 2021-06-03 | Stop reason: SDUPTHER

## 2021-03-09 NOTE — TELEPHONE ENCOUNTER
From: Sadie Burton  To: Moustapha Velasquez DO  Sent: 3/8/2021 3:36 PM EST  Subject: Prescription Question    my hydroxyzine hcl 25 mg. i need a refill and i got on the refill part of my chart but it said it could not be refilled and please set this med. up for 90 days supply. also where can i and my brother go and get a covid shot.   thank you nacho tinsley

## 2021-03-09 NOTE — TELEPHONE ENCOUNTER
Patient given Covid Vaccine number through [id number] [facility]. Patient informed awaiting Dr. Clementine Smith to approve below refill.

## 2021-05-07 ENCOUNTER — PATIENT MESSAGE (OUTPATIENT)
Dept: FAMILY MEDICINE CLINIC | Age: 66
End: 2021-05-07

## 2021-05-07 RX ORDER — FAMOTIDINE 40 MG/1
20 TABLET, FILM COATED ORAL 2 TIMES DAILY
Qty: 90 TABLET | Refills: 0 | Status: SHIPPED | OUTPATIENT
Start: 2021-05-07 | End: 2021-07-11 | Stop reason: SDUPTHER

## 2021-05-07 NOTE — TELEPHONE ENCOUNTER
From: Darroll Shone  To:  Hair Ramirez DO  Sent: 5/7/2021 2:31 PM EDT  Subject: Prescription Question    Famotidine i tryed to get a refill but it said that it could not be refilled on my chart the rx # is [id number]

## 2021-06-03 ENCOUNTER — PATIENT MESSAGE (OUTPATIENT)
Dept: FAMILY MEDICINE CLINIC | Age: 66
End: 2021-06-03

## 2021-06-03 RX ORDER — HYDROXYZINE HYDROCHLORIDE 25 MG/1
25 TABLET, FILM COATED ORAL 3 TIMES DAILY PRN
Qty: 270 TABLET | Refills: 1 | Status: SHIPPED | OUTPATIENT
Start: 2021-06-03 | End: 2021-11-30 | Stop reason: SDUPTHER

## 2021-06-03 NOTE — TELEPHONE ENCOUNTER
From: Maliha Walker  To: Roverto Martinez DO  Sent: 6/3/2021 10:46 AM EDT  Subject: Prescription Question    i need to have my med. refilled and Carlos Quijano says it can not be refilled with mychart .  the meds i need refilled are hydroxyzine please refill for 3 months   thank you for your help nacho tinsley

## 2021-07-12 RX ORDER — FAMOTIDINE 40 MG/1
20 TABLET, FILM COATED ORAL 2 TIMES DAILY
Qty: 90 TABLET | Refills: 0 | Status: SHIPPED | OUTPATIENT
Start: 2021-07-12 | End: 2021-12-27 | Stop reason: SDUPTHER

## 2021-08-10 ENCOUNTER — OFFICE VISIT (OUTPATIENT)
Dept: FAMILY MEDICINE CLINIC | Age: 66
End: 2021-08-10
Payer: MEDICARE

## 2021-08-10 VITALS
OXYGEN SATURATION: 97 % | DIASTOLIC BLOOD PRESSURE: 74 MMHG | BODY MASS INDEX: 30.76 KG/M2 | WEIGHT: 196.4 LBS | SYSTOLIC BLOOD PRESSURE: 122 MMHG | HEART RATE: 53 BPM

## 2021-08-10 DIAGNOSIS — E07.9 THYROID DISEASE: Primary | ICD-10-CM

## 2021-08-10 DIAGNOSIS — M25.561 CHRONIC PAIN OF BOTH KNEES: ICD-10-CM

## 2021-08-10 DIAGNOSIS — G89.29 CHRONIC PAIN OF BOTH KNEES: ICD-10-CM

## 2021-08-10 DIAGNOSIS — K21.00 GASTROESOPHAGEAL REFLUX DISEASE WITH ESOPHAGITIS WITHOUT HEMORRHAGE: ICD-10-CM

## 2021-08-10 DIAGNOSIS — E78.2 MIXED HYPERLIPIDEMIA: ICD-10-CM

## 2021-08-10 DIAGNOSIS — M25.562 CHRONIC PAIN OF BOTH KNEES: ICD-10-CM

## 2021-08-10 LAB
A/G RATIO: 1.4 (ref 1.1–2.2)
ALBUMIN SERPL-MCNC: 4.2 G/DL (ref 3.4–5)
ALP BLD-CCNC: 51 U/L (ref 40–129)
ALT SERPL-CCNC: 19 U/L (ref 10–40)
ANION GAP SERPL CALCULATED.3IONS-SCNC: 8 MMOL/L (ref 3–16)
AST SERPL-CCNC: 20 U/L (ref 15–37)
BILIRUB SERPL-MCNC: 0.4 MG/DL (ref 0–1)
BUN BLDV-MCNC: 21 MG/DL (ref 7–20)
CALCIUM SERPL-MCNC: 9.2 MG/DL (ref 8.3–10.6)
CHLORIDE BLD-SCNC: 105 MMOL/L (ref 99–110)
CHOLESTEROL, TOTAL: 193 MG/DL (ref 0–199)
CO2: 24 MMOL/L (ref 21–32)
CREAT SERPL-MCNC: 0.8 MG/DL (ref 0.8–1.3)
GFR AFRICAN AMERICAN: >60
GFR NON-AFRICAN AMERICAN: >60
GLOBULIN: 3 G/DL
GLUCOSE BLD-MCNC: 121 MG/DL (ref 70–99)
HCT VFR BLD CALC: 44.4 % (ref 40.5–52.5)
HDLC SERPL-MCNC: 41 MG/DL (ref 40–60)
HEMOGLOBIN: 14.9 G/DL (ref 13.5–17.5)
LDL CHOLESTEROL CALCULATED: 134 MG/DL
MCH RBC QN AUTO: 30.5 PG (ref 26–34)
MCHC RBC AUTO-ENTMCNC: 33.4 G/DL (ref 31–36)
MCV RBC AUTO: 91.3 FL (ref 80–100)
PDW BLD-RTO: 13 % (ref 12.4–15.4)
PLATELET # BLD: 298 K/UL (ref 135–450)
PMV BLD AUTO: 8.2 FL (ref 5–10.5)
POTASSIUM SERPL-SCNC: 4.5 MMOL/L (ref 3.5–5.1)
RBC # BLD: 4.87 M/UL (ref 4.2–5.9)
SODIUM BLD-SCNC: 137 MMOL/L (ref 136–145)
TOTAL PROTEIN: 7.2 G/DL (ref 6.4–8.2)
TRIGL SERPL-MCNC: 88 MG/DL (ref 0–150)
TSH REFLEX FT4: 3.15 UIU/ML (ref 0.27–4.2)
VLDLC SERPL CALC-MCNC: 18 MG/DL
WBC # BLD: 5.7 K/UL (ref 4–11)

## 2021-08-10 PROCEDURE — G8417 CALC BMI ABV UP PARAM F/U: HCPCS | Performed by: FAMILY MEDICINE

## 2021-08-10 PROCEDURE — 3017F COLORECTAL CA SCREEN DOC REV: CPT | Performed by: FAMILY MEDICINE

## 2021-08-10 PROCEDURE — 99214 OFFICE O/P EST MOD 30 MIN: CPT | Performed by: FAMILY MEDICINE

## 2021-08-10 PROCEDURE — 4040F PNEUMOC VAC/ADMIN/RCVD: CPT | Performed by: FAMILY MEDICINE

## 2021-08-10 PROCEDURE — 1036F TOBACCO NON-USER: CPT | Performed by: FAMILY MEDICINE

## 2021-08-10 PROCEDURE — G8427 DOCREV CUR MEDS BY ELIG CLIN: HCPCS | Performed by: FAMILY MEDICINE

## 2021-08-10 PROCEDURE — 1123F ACP DISCUSS/DSCN MKR DOCD: CPT | Performed by: FAMILY MEDICINE

## 2021-08-10 PROCEDURE — 36415 COLL VENOUS BLD VENIPUNCTURE: CPT | Performed by: FAMILY MEDICINE

## 2021-08-10 RX ORDER — MELOXICAM 7.5 MG/1
7.5 TABLET ORAL DAILY
Qty: 30 TABLET | Refills: 3 | Status: SHIPPED | OUTPATIENT
Start: 2021-08-10 | End: 2021-11-30 | Stop reason: SDUPTHER

## 2021-08-10 RX ORDER — ZOSTER VACCINE RECOMBINANT, ADJUVANTED 50 MCG/0.5
0.5 KIT INTRAMUSCULAR SEE ADMIN INSTRUCTIONS
Qty: 0.5 ML | Refills: 0 | Status: SHIPPED | OUTPATIENT
Start: 2021-08-10 | End: 2022-02-06

## 2021-08-10 SDOH — ECONOMIC STABILITY: FOOD INSECURITY: WITHIN THE PAST 12 MONTHS, YOU WORRIED THAT YOUR FOOD WOULD RUN OUT BEFORE YOU GOT MONEY TO BUY MORE.: NEVER TRUE

## 2021-08-10 SDOH — ECONOMIC STABILITY: FOOD INSECURITY: WITHIN THE PAST 12 MONTHS, THE FOOD YOU BOUGHT JUST DIDN'T LAST AND YOU DIDN'T HAVE MONEY TO GET MORE.: NEVER TRUE

## 2021-08-10 ASSESSMENT — ENCOUNTER SYMPTOMS
BLOOD IN STOOL: 0
COUGH: 0
SINUS PRESSURE: 0
SHORTNESS OF BREATH: 0
NAUSEA: 0
ABDOMINAL PAIN: 0
SORE THROAT: 0
RHINORRHEA: 0
VOMITING: 0
ANAL BLEEDING: 0
DIARRHEA: 0

## 2021-08-10 ASSESSMENT — PATIENT HEALTH QUESTIONNAIRE - PHQ9
SUM OF ALL RESPONSES TO PHQ QUESTIONS 1-9: 0
SUM OF ALL RESPONSES TO PHQ QUESTIONS 1-9: 0
1. LITTLE INTEREST OR PLEASURE IN DOING THINGS: 0
2. FEELING DOWN, DEPRESSED OR HOPELESS: 0
SUM OF ALL RESPONSES TO PHQ9 QUESTIONS 1 & 2: 0
SUM OF ALL RESPONSES TO PHQ QUESTIONS 1-9: 0

## 2021-08-10 ASSESSMENT — SOCIAL DETERMINANTS OF HEALTH (SDOH): HOW HARD IS IT FOR YOU TO PAY FOR THE VERY BASICS LIKE FOOD, HOUSING, MEDICAL CARE, AND HEATING?: NOT HARD AT ALL

## 2021-08-10 ASSESSMENT — LIFESTYLE VARIABLES: HOW OFTEN DO YOU HAVE A DRINK CONTAINING ALCOHOL: 0

## 2021-08-10 NOTE — PROGRESS NOTES
8/10/2021     Darlene Pace (:  1955) is a 74 y.o. male, here for evaluation of the following medical concerns:    HPI     1.  Hypothyroidism- on medication for over one year, patient had had this checked for sometime, is on 125 mcg, tolerates well, needs this checked today, denied fatigue, tremors etc.. takes atarax as a side effect to the synthroid medication.      2.  GERD- patient is well controlled on Pepcid, no side effects tolerates medication well.  Reduced his Pepcid to 40 mg at night, down from 40 mg BID.     3. Screening- patient is in need of a number of screening labs and procedures. He needs his pneumonia vaccination today.      4.. hyperlipididemia- patient was supposed to diet and exercise in an attempt to lose weight, he has lost 3 pounds,  patient will have lipids checked today. History of high cholesterol, father passed away or CAD. 5.  Bilateral knee pain- years, would like to try a medication at this time.      Today, chest pain, sob, n, v,or diarrhea. Review of Systems   Constitutional: Negative for activity change, fatigue, fever and unexpected weight change. HENT: Negative for congestion, ear pain, rhinorrhea, sinus pressure and sore throat. Respiratory: Negative for cough and shortness of breath. Cardiovascular: Negative for chest pain, palpitations and leg swelling. Gastrointestinal: Negative for abdominal pain, anal bleeding, blood in stool, diarrhea, nausea and vomiting. Endocrine: Negative for cold intolerance, heat intolerance, polydipsia and polyphagia. Genitourinary: Negative for urgency. Musculoskeletal: Positive for gait problem. Negative for arthralgias. Skin: Negative for rash. Neurological: Negative for dizziness, light-headedness and headaches. Psychiatric/Behavioral: Negative for dysphoric mood. The patient is not nervous/anxious. Prior to Visit Medications    Medication Sig Taking?  Authorizing Provider   zoster recombinant heard.     Pulmonary:      Effort: Pulmonary effort is normal.      Breath sounds: Normal breath sounds. No wheezing or rales. Abdominal:      General: Bowel sounds are normal.      Palpations: Abdomen is soft. Tenderness: There is no abdominal tenderness. Musculoskeletal:         General: Normal range of motion. Skin:     Findings: No rash. Neurological:      Mental Status: He is alert and oriented to person, place, and time. Deep Tendon Reflexes: Reflexes normal.   Psychiatric:         Behavior: Behavior normal.         Judgment: Judgment normal.         ASSESSMENT/PLAN:  Thyroid disease  Stable  Continue with medication  Keep appointments with specialist.   Answered questions    Mixed Hyperlipidemia   Take medication as prescribed. Discussed the need to exercise 30 minutes each day. Monitor diet, avoid fried foods etc... Educated on need to reduce cholesterol in diet and results of poor diet. GERD  Take medication as prescribed. Raise head of bed. Avoid trigger foods. Discussed side effects of the medication. Please RTC if not improved. Chronic knee pain  Patient will use medication  he was educated on the medication and its use. he will  Use ice, heat, and stretching. he was shown stretching exercises. he will push fluids and rest.   RTC if not improved. Return in about 6 months (around 2/10/2022) for NEEDS AWV on a MONDAY.

## 2021-08-12 ASSESSMENT — LIFESTYLE VARIABLES
AUDIT TOTAL SCORE: INCOMPLETE
AUDIT-C TOTAL SCORE: INCOMPLETE
HOW OFTEN DO YOU HAVE A DRINK CONTAINING ALCOHOL: NEVER

## 2021-08-30 ENCOUNTER — VIRTUAL VISIT (OUTPATIENT)
Dept: FAMILY MEDICINE CLINIC | Age: 66
End: 2021-08-30
Payer: MEDICARE

## 2021-08-30 DIAGNOSIS — Z00.00 ENCOUNTER FOR ANNUAL WELLNESS VISIT (AWV) IN MEDICARE PATIENT: Primary | ICD-10-CM

## 2021-08-30 DIAGNOSIS — Z00.00 ROUTINE GENERAL MEDICAL EXAMINATION AT A HEALTH CARE FACILITY: ICD-10-CM

## 2021-08-30 PROCEDURE — 4040F PNEUMOC VAC/ADMIN/RCVD: CPT | Performed by: FAMILY MEDICINE

## 2021-08-30 PROCEDURE — G0438 PPPS, INITIAL VISIT: HCPCS | Performed by: FAMILY MEDICINE

## 2021-08-30 PROCEDURE — 3017F COLORECTAL CA SCREEN DOC REV: CPT | Performed by: FAMILY MEDICINE

## 2021-08-30 PROCEDURE — 1123F ACP DISCUSS/DSCN MKR DOCD: CPT | Performed by: FAMILY MEDICINE

## 2021-08-30 ASSESSMENT — PATIENT HEALTH QUESTIONNAIRE - PHQ9
SUM OF ALL RESPONSES TO PHQ9 QUESTIONS 1 & 2: 0
SUM OF ALL RESPONSES TO PHQ QUESTIONS 1-9: 0
2. FEELING DOWN, DEPRESSED OR HOPELESS: 0
1. LITTLE INTEREST OR PLEASURE IN DOING THINGS: 0

## 2021-08-30 NOTE — PROGRESS NOTES
Medicare Annual Wellness Visit  Name: Immanuel Ying Date: 2021   MRN: [id number] Sex: Male   Age: 74 y.o. Ethnicity: Non- / Non    : 1955 Race: White (non-)      Carolynn Huang is here for Medicare AWV    Screenings for behavioral, psychosocial and functional/safety risks, and cognitive dysfunction are all negative except as indicated below. These results, as well as other patient data from the [address] form, are documented in Flowsheets linked to this Encounter. No Known Allergies      Prior to Visit Medications    Medication Sig Taking?  Authorizing Provider   meloxicam (MOBIC) 7.5 MG tablet Take 1 tablet by mouth daily Yes Ethan Perez DO   famotidine (PEPCID) 40 MG tablet Take 0.5 tablets by mouth 2 times daily Yes Ethan Perez DO   hydrOXYzine (ATARAX) 25 MG tablet Take 1 tablet by mouth 3 times daily as needed for Itching Yes Solomon Lowery DO   levothyroxine (SYNTHROID) 100 MCG injection Infuse 50 mcg intravenously Daily Yes Historical Provider, MD   ibuprofen (ADVIL;MOTRIN) 200 MG tablet Take 200-400 mg by mouth every 6 hours as needed for Pain Yes Historical Provider, MD   tadalafil (CIALIS) 20 MG tablet Take 20 mg by mouth as needed for Erectile Dysfunction Yes Historical Provider, MD   zoster recombinant adjuvanted vaccine (SHINGRIX) 50 MCG/0.5ML SUSR injection Inject 0.5 mLs into the muscle See Admin Instructions 1 dose now and repeat in 2-6 months  Solomon Lowery DO         Past Medical History:   Diagnosis Date    Erectile dysfunction     [place] (hard of hearing)     Hyperlipidemia     Hypothyroidism     Reflux     Thyroid disease     Wears hearing aid     bilateral       Past Surgical History:   Procedure Laterality Date    CARPAL TUNNEL RELEASE Right 13    COLONOSCOPY N/A 2020    COLORECTAL CANCER SCREENING, NOT HIGH RISK performed by Angelo Milner MD at [address] [place] [address] Bilateral     Lasik  FINGER TRIGGER RELEASE Left 10/15/2020    LEFT MIDDLE FINGER TRIGGER FINGER RELEASE [facility] History   Problem Relation Age of Onset    High Blood Pressure Mother     Stroke Mother     Heart Disease Father     Heart Attack Father     Kidney Disease Sister        CareTeam (Including outside providers/suppliers regularly involved in providing care):   Patient Care Team:  Kami Simpson DO as PCP - General (Family Medicine)  K[facility] Simpson [facility]    Wt Readings from Last 3 Encounters:   08/10/21 196 lb 6.4 oz (89.1 kg)   02/08/21 193 lb 6.4 oz (87.7 kg)   12/29/20 188 lb 12.8 oz (85.6 kg)     Patient-Reported Vitals 8/30/2021   Patient-Reported Weight 196lbs   Patient-Reported Height 5'7\"      There is no height or weight on file to calculate BMI. Based upon direct observation of the patient, evaluation of cognition reveals recent and remote memory intact. None    Patient's complete Health Risk Assessment and screening values have been reviewed and are found in Flowsheets. The following problems were reviewed today and where indicated follow up appointments were made and/or referrals ordered. Positive Risk Factor Screenings with Interventions:            General Health and ACP:  General  In general, how would you say your health is?: Good  In the past 7 days, have you experienced any of the following?  New or Increased Pain, New or Increased Fatigue, Loneliness, Social Isolation, Stress or Anger?: None of These  Do you get the social and emotional support that you need?: Yes  Do you have a Living Will?: (!) No  Advance Directives     Power of [address] [place] [address] Will ACP-Advance Directive ACP-Power of     Not on File Not on File Not on File Not on File      General Health Risk Interventions:  · No Living Will: Advance Care Planning addressed with patient today    Health Habits/Nutrition:  Health Habits/Nutrition  Do you Completed    COVID-19 Vaccine  Completed    AAA screen  Completed    Hepatitis C screen  Completed    Hepatitis A vaccine  Aged Out    Hepatitis B vaccine  Aged Out    Hib vaccine  Aged Out    Meningococcal (ACWY) vaccine  Aged Out     Recommendations for [employer] Due: see orders and patient instructions/AVS.  . Recommended screening schedule for the next 5-10 years is provided to the patient in written form: see Patient Instructions/AVS.    There are no diagnoses linked to this encounter. Briseyda Albarran is a 74 y.o. male being evaluated by a Virtual Visit (video and audio) encounter to address concerns as mentioned above. A caregiver was present when appropriate. Due to this being a TeleHealth encounter (During [facility]-04 public health emergency), evaluation of the following organ systems was limited: Vitals/Constitutional/EENT/Resp/CV/GI//MS/Neuro/Skin/Heme-Lymph-Imm. Pursuant to the emergency declaration under the [address] [place] [address], [address] [place] [address] and the [employer] Act, this Virtual Visit was conducted with patient's (and/or legal guardian's) consent, to reduce the patient's risk of exposure to COVID-19 and provide necessary medical care. The patient (and/or legal guardian) has also been advised to contact this office for worsening conditions or problems, and seek emergency medical treatment and/or call 911 if deemed necessary. Patient identification was verified at the start of the visit: Yes    Services were provided through a video synchronous discussion virtually to substitute for in-person clinic visit. Patient and provider were located at their individual homes. --Federica Rubio DO on 8/30/2021 at 9:50 AM    An electronic signature was used to authenticate this note.

## 2021-08-30 NOTE — PATIENT INSTRUCTIONS
Personalized Preventive Plan for Shine Webster - 8/30/2021  Medicare offers a range of preventive health benefits. Some of the tests and screenings are paid in full while other may be subject to a deductible, co-insurance, and/or copay. Some of these benefits include a comprehensive review of your medical history including lifestyle, illnesses that may run in your family, and various assessments and screenings as appropriate. After reviewing your medical record and screening and assessments performed today your provider may have ordered immunizations, labs, imaging, and/or referrals for you. A list of these orders (if applicable) as well as your Preventive Care list are included within your After Visit Summary for your review. Other Preventive Recommendations:    · A preventive eye exam performed by an eye specialist is recommended every 1-2 years to screen for glaucoma; cataracts, macular degeneration, and other eye disorders. · A preventive dental visit is recommended every 6 months. · Try to get at least 150 minutes of exercise per week or 10,000 steps per day on a pedometer . · Order or download the FREE \"Exercise & Physical Activity: Your Everyday Guide\" from [employer]. Call 6-247.540.9893 or search [employer] online. · You need 2874-8995 mg of calcium and 1530-9543 IU of vitamin D per day. It is possible to meet your calcium requirement with diet alone, but a vitamin D supplement is usually necessary to meet this goal.  · When exposed to the sun, use a sunscreen that protects against both UVA and UVB radiation with an SPF of 30 or greater. Reapply every 2 to 3 hours or after sweating, drying off with a towel, or swimming. · Always wear a seat belt when traveling in a car. Always wear a helmet when riding a bicycle or motorcycle.

## 2021-10-19 ENCOUNTER — PATIENT MESSAGE (OUTPATIENT)
Dept: FAMILY MEDICINE CLINIC | Age: 66
End: 2021-10-19

## 2021-10-20 RX ORDER — LEVOTHYROXINE SODIUM ANHYDROUS 100 UG/5ML
50 INJECTION, POWDER, LYOPHILIZED, FOR SOLUTION INTRAVENOUS DAILY
Qty: 90 EACH | Refills: 1 | Status: SHIPPED | OUTPATIENT
Start: 2021-10-20 | End: 2021-10-27

## 2021-10-27 RX ORDER — LEVOTHYROXINE SODIUM 0.12 MG/1
125 TABLET ORAL DAILY
Qty: 90 TABLET | Refills: 1 | Status: SHIPPED | OUTPATIENT
Start: 2021-10-27 | End: 2022-04-19

## 2021-11-30 RX ORDER — HYDROXYZINE HYDROCHLORIDE 25 MG/1
25 TABLET, FILM COATED ORAL 3 TIMES DAILY PRN
Qty: 270 TABLET | Refills: 1 | Status: SHIPPED | OUTPATIENT
Start: 2021-11-30 | End: 2022-05-27

## 2021-11-30 RX ORDER — MELOXICAM 7.5 MG/1
7.5 TABLET ORAL DAILY
Qty: 30 TABLET | Refills: 3 | Status: SHIPPED | OUTPATIENT
Start: 2021-11-30 | End: 2022-02-10

## 2021-12-27 ENCOUNTER — PATIENT MESSAGE (OUTPATIENT)
Dept: FAMILY MEDICINE CLINIC | Age: 66
End: 2021-12-27

## 2021-12-27 RX ORDER — TADALAFIL 20 MG/1
20 TABLET ORAL PRN
Qty: 30 TABLET | Refills: 0 | Status: SHIPPED | OUTPATIENT
Start: 2021-12-27 | End: 2022-04-13 | Stop reason: SDUPTHER

## 2021-12-27 RX ORDER — FAMOTIDINE 40 MG/1
20 TABLET, FILM COATED ORAL 2 TIMES DAILY
Qty: 90 TABLET | Refills: 0 | Status: SHIPPED | OUTPATIENT
Start: 2021-12-27

## 2021-12-27 NOTE — TELEPHONE ENCOUNTER
From: Vee Beach  To: Dr. Rambo Ornelas: 12/27/2021 1:21 PM EST  Subject: refill for tadalafil    i need to have this med refilled it is tadalafil 20 mg tablet please sent to [facility] # [id number] on [address] if you can will you up the number of tablets are now i get Qty. of 10.     thank you for you time     César Garcia

## 2022-02-10 ENCOUNTER — OFFICE VISIT (OUTPATIENT)
Dept: FAMILY MEDICINE CLINIC | Age: 67
End: 2022-02-10
Payer: MEDICARE

## 2022-02-10 VITALS
SYSTOLIC BLOOD PRESSURE: 132 MMHG | HEART RATE: 52 BPM | WEIGHT: 183 LBS | DIASTOLIC BLOOD PRESSURE: 80 MMHG | BODY MASS INDEX: 28.66 KG/M2 | OXYGEN SATURATION: 97 %

## 2022-02-10 DIAGNOSIS — E78.2 MIXED HYPERLIPIDEMIA: ICD-10-CM

## 2022-02-10 DIAGNOSIS — E07.9 THYROID DISEASE: ICD-10-CM

## 2022-02-10 DIAGNOSIS — K21.9 GASTROESOPHAGEAL REFLUX DISEASE WITHOUT ESOPHAGITIS: Primary | Chronic | ICD-10-CM

## 2022-02-10 DIAGNOSIS — M17.0 OSTEOARTHRITIS OF BOTH KNEES, UNSPECIFIED OSTEOARTHRITIS TYPE: ICD-10-CM

## 2022-02-10 LAB — HBA1C MFR BLD: 5.6 %

## 2022-02-10 PROCEDURE — 1036F TOBACCO NON-USER: CPT | Performed by: FAMILY MEDICINE

## 2022-02-10 PROCEDURE — G8417 CALC BMI ABV UP PARAM F/U: HCPCS | Performed by: FAMILY MEDICINE

## 2022-02-10 PROCEDURE — G0008 ADMIN INFLUENZA VIRUS VAC: HCPCS | Performed by: FAMILY MEDICINE

## 2022-02-10 PROCEDURE — 99214 OFFICE O/P EST MOD 30 MIN: CPT | Performed by: FAMILY MEDICINE

## 2022-02-10 PROCEDURE — 4040F PNEUMOC VAC/ADMIN/RCVD: CPT | Performed by: FAMILY MEDICINE

## 2022-02-10 PROCEDURE — G8427 DOCREV CUR MEDS BY ELIG CLIN: HCPCS | Performed by: FAMILY MEDICINE

## 2022-02-10 PROCEDURE — 83036 HEMOGLOBIN GLYCOSYLATED A1C: CPT | Performed by: FAMILY MEDICINE

## 2022-02-10 PROCEDURE — G8484 FLU IMMUNIZE NO ADMIN: HCPCS | Performed by: FAMILY MEDICINE

## 2022-02-10 PROCEDURE — 3017F COLORECTAL CA SCREEN DOC REV: CPT | Performed by: FAMILY MEDICINE

## 2022-02-10 PROCEDURE — 90694 VACC AIIV4 NO PRSRV 0.5ML IM: CPT | Performed by: FAMILY MEDICINE

## 2022-02-10 PROCEDURE — 1123F ACP DISCUSS/DSCN MKR DOCD: CPT | Performed by: FAMILY MEDICINE

## 2022-02-10 RX ORDER — MELOXICAM 15 MG/1
15 TABLET ORAL DAILY
Qty: 90 TABLET | Refills: 1 | Status: SHIPPED | OUTPATIENT
Start: 2022-02-10 | End: 2022-08-10

## 2022-02-10 ASSESSMENT — PATIENT HEALTH QUESTIONNAIRE - PHQ9
SUM OF ALL RESPONSES TO PHQ QUESTIONS 1-9: 0
SUM OF ALL RESPONSES TO PHQ9 QUESTIONS 1 & 2: 0
SUM OF ALL RESPONSES TO PHQ QUESTIONS 1-9: 0
1. LITTLE INTEREST OR PLEASURE IN DOING THINGS: 0
2. FEELING DOWN, DEPRESSED OR HOPELESS: 0
SUM OF ALL RESPONSES TO PHQ QUESTIONS 1-9: 0
SUM OF ALL RESPONSES TO PHQ QUESTIONS 1-9: 0

## 2022-02-10 ASSESSMENT — ENCOUNTER SYMPTOMS
ANAL BLEEDING: 0
SORE THROAT: 0
WHEEZING: 0
EYE DISCHARGE: 0
BLOOD IN STOOL: 0
COUGH: 0
FACIAL SWELLING: 0
RHINORRHEA: 0
SINUS PRESSURE: 0
NAUSEA: 0
TROUBLE SWALLOWING: 0
SHORTNESS OF BREATH: 0
DIARRHEA: 0
ABDOMINAL PAIN: 0
CHEST TIGHTNESS: 0
VOMITING: 0

## 2022-02-10 NOTE — PROGRESS NOTES
2/10/2022     Sanna Maldonado (:  1955) is a 74 y.o. male, here for evaluation of the following medical concerns:    HPI     1.  Hypothyroidism- on medication for over one year, patient had had this checked for sometime, is on 125 mcg, tolerates well, needs this checked today, denied fatigue, tremors etc.. takes atarax as a side effect to the synthroid medication.      2.  GERD- patient is well controlled on Pepcid, no side effects tolerates medication well.  Reduced his Pepcid to 40 mg at night, down from 40 mg BID.     3. Screening- patient wants flu shot today.     4.. hyperlipididemia- patient was supposed to diet and exercise in an attempt to lose weight, he has lost 3 pounds,  patient will have lipids checked today. History of high cholesterol, father passed away or CAD.      5.  Bilateral knee pain- years, would like to try a medication at this time. Will increase his medication.      Today, chest pain, sob, n, v,or diarrhea. Review of Systems   Constitutional: Negative for activity change, fatigue, fever and unexpected weight change. HENT: Negative for congestion, ear pain, facial swelling, hearing loss, rhinorrhea, sinus pressure, sore throat and trouble swallowing. Eyes: Negative for discharge and visual disturbance. Respiratory: Negative for cough, chest tightness, shortness of breath and wheezing. Cardiovascular: Negative for chest pain, palpitations and leg swelling. Gastrointestinal: Negative for abdominal pain, anal bleeding, blood in stool, diarrhea, nausea and vomiting. Endocrine: Negative for cold intolerance, heat intolerance, polydipsia and polyphagia. Genitourinary: Negative for decreased urine volume, dysuria, frequency, genital sores, penile discharge, penile pain, testicular pain and urgency. Musculoskeletal: Negative for arthralgias. Skin: Negative for rash. Allergic/Immunologic: Negative for food allergies.    Neurological: Negative for dizziness, syncope, light-headedness and headaches. Hematological: Does not bruise/bleed easily. Psychiatric/Behavioral: Negative for suicidal ideas. The patient is not nervous/anxious. Prior to Visit Medications    Medication Sig Taking? Authorizing Provider   meloxicam (MOBIC) 15 MG tablet Take 1 tablet by mouth daily Yes Ethan Perez, DO   famotidine (PEPCID) 40 MG tablet Take 0.5 tablets by mouth 2 times daily Yes Ethan Perez DO   tadalafil (CIALIS) 20 MG tablet Take 1 tablet by mouth as needed for Erectile Dysfunction Yes Juliana Perez, DO   hydrOXYzine (ATARAX) 25 MG tablet Take 1 tablet by mouth 3 times daily as needed for Itching Yes Jessica Wang,    levothyroxine (SYNTHROID) 125 MCG tablet Take 1 tablet by mouth daily Yes Ethan Perez DO   ibuprofen (ADVIL;MOTRIN) 200 MG tablet Take 200-400 mg by mouth every 6 hours as needed for Pain Yes Historical Provider, MD        Social History     Tobacco Use    Smoking status: Former Smoker     Packs/day: 1.00     Years: 24.00     Pack years: 24.00     Types: Cigarettes     Quit date: 1990     Years since quittin.2    Smokeless tobacco: Never Used    Tobacco comment: quit smoking over 20 years ago   Substance Use Topics    Alcohol use: Not Currently     Comment: social        Vitals:    02/10/22 0756   BP: 132/80   Pulse: 52   SpO2: 97%   Weight: 183 lb (83 kg)     Estimated body mass index is 28.66 kg/m² as calculated from the following:    Height as of 21: 5' 7\" (1.702 m). Weight as of this encounter: 183 lb (83 kg). Physical Exam    ASSESSMENT/PLAN:  1. Gastroesophageal reflux disease without esophagitis  Take medication as prescribed. Raise head of bed. Avoid trigger foods. Discussed side effects of the medication. Please RTC if not improved. 2. Thyroid disease  Stable  Continue with medication  Keep appointments with specialist.   Perfecto Crate questions    3. Mixed hyperlipidemia  Take medication as prescribed.    Discussed the need to exercise 30 minutes each day. Monitor diet, avoid fried foods etc... Educated on need to reduce cholesterol in diet and results of poor diet. 4. Osteoarthritis of both knees, unspecified osteoarthritis type  Patient was prescribed mobilc  he was educated on the medication and its use. he will  Use ice, heat, and stretching. he will push fluids and rest.   RTC if not improved. Return in about 6 months (around 8/10/2022).

## 2022-02-13 PROBLEM — M17.0 OSTEOARTHRITIS OF BOTH KNEES: Status: ACTIVE | Noted: 2022-02-13

## 2022-04-14 RX ORDER — TADALAFIL 20 MG/1
20 TABLET ORAL PRN
Qty: 30 TABLET | Refills: 0 | Status: SHIPPED | OUTPATIENT
Start: 2022-04-14 | End: 2022-08-31 | Stop reason: SDUPTHER

## 2022-04-14 NOTE — TELEPHONE ENCOUNTER
Last office visit 2/10/2022     Last written     Next office visit scheduled 8/11/2022    Requested Prescriptions     Pending Prescriptions Disp Refills    tadalafil (CIALIS) 20 MG tablet 30 tablet 0     Sig: Take 1 tablet by mouth as needed for Erectile Dysfunction

## 2022-04-19 RX ORDER — LEVOTHYROXINE SODIUM 0.12 MG/1
125 TABLET ORAL DAILY
Qty: 90 TABLET | Refills: 1 | Status: SHIPPED | OUTPATIENT
Start: 2022-04-19 | End: 2022-10-04

## 2022-05-27 RX ORDER — HYDROXYZINE HYDROCHLORIDE 25 MG/1
TABLET, FILM COATED ORAL
Qty: 270 TABLET | Refills: 1 | Status: SHIPPED | OUTPATIENT
Start: 2022-05-27 | End: 2022-08-31 | Stop reason: SDUPTHER

## 2022-08-10 DIAGNOSIS — K21.9 GASTROESOPHAGEAL REFLUX DISEASE WITHOUT ESOPHAGITIS: Chronic | ICD-10-CM

## 2022-08-10 DIAGNOSIS — E07.9 THYROID DISEASE: ICD-10-CM

## 2022-08-10 DIAGNOSIS — M17.0 OSTEOARTHRITIS OF BOTH KNEES, UNSPECIFIED OSTEOARTHRITIS TYPE: ICD-10-CM

## 2022-08-10 DIAGNOSIS — E78.2 MIXED HYPERLIPIDEMIA: ICD-10-CM

## 2022-08-10 RX ORDER — MELOXICAM 15 MG/1
TABLET ORAL
Qty: 90 TABLET | Refills: 1 | Status: SHIPPED | OUTPATIENT
Start: 2022-08-10

## 2022-08-31 ENCOUNTER — OFFICE VISIT (OUTPATIENT)
Dept: FAMILY MEDICINE CLINIC | Age: 67
End: 2022-08-31
Payer: MEDICARE

## 2022-08-31 VITALS
DIASTOLIC BLOOD PRESSURE: 82 MMHG | HEIGHT: 67 IN | SYSTOLIC BLOOD PRESSURE: 120 MMHG | WEIGHT: 192 LBS | BODY MASS INDEX: 30.13 KG/M2

## 2022-08-31 DIAGNOSIS — E78.2 MIXED HYPERLIPIDEMIA: ICD-10-CM

## 2022-08-31 DIAGNOSIS — M17.0 OSTEOARTHRITIS OF BOTH KNEES, UNSPECIFIED OSTEOARTHRITIS TYPE: ICD-10-CM

## 2022-08-31 DIAGNOSIS — E07.9 THYROID DISEASE: ICD-10-CM

## 2022-08-31 DIAGNOSIS — N52.9 ERECTILE DYSFUNCTION, UNSPECIFIED ERECTILE DYSFUNCTION TYPE: Primary | ICD-10-CM

## 2022-08-31 DIAGNOSIS — K21.9 GASTROESOPHAGEAL REFLUX DISEASE WITHOUT ESOPHAGITIS: Chronic | ICD-10-CM

## 2022-08-31 LAB
A/G RATIO: 1.4 (ref 1.1–2.2)
ALBUMIN SERPL-MCNC: 4.2 G/DL (ref 3.4–5)
ALP BLD-CCNC: 54 U/L (ref 40–129)
ALT SERPL-CCNC: 20 U/L (ref 10–40)
ANION GAP SERPL CALCULATED.3IONS-SCNC: 11 MMOL/L (ref 3–16)
AST SERPL-CCNC: 23 U/L (ref 15–37)
BASOPHILS ABSOLUTE: 0 K/UL (ref 0–0.2)
BASOPHILS RELATIVE PERCENT: 0.5 %
BILIRUB SERPL-MCNC: 0.5 MG/DL (ref 0–1)
BUN BLDV-MCNC: 26 MG/DL (ref 7–20)
CALCIUM SERPL-MCNC: 9.1 MG/DL (ref 8.3–10.6)
CHLORIDE BLD-SCNC: 107 MMOL/L (ref 99–110)
CHOLESTEROL, TOTAL: 196 MG/DL (ref 0–199)
CO2: 25 MMOL/L (ref 21–32)
CREAT SERPL-MCNC: 0.9 MG/DL (ref 0.8–1.3)
EOSINOPHILS ABSOLUTE: 0.2 K/UL (ref 0–0.6)
EOSINOPHILS RELATIVE PERCENT: 3.1 %
GFR AFRICAN AMERICAN: >60
GFR NON-AFRICAN AMERICAN: >60
GLUCOSE BLD-MCNC: 116 MG/DL (ref 70–99)
HCT VFR BLD CALC: 44.3 % (ref 40.5–52.5)
HDLC SERPL-MCNC: 43 MG/DL (ref 40–60)
HEMOGLOBIN: 14.9 G/DL (ref 13.5–17.5)
LDL CHOLESTEROL CALCULATED: 137 MG/DL
LYMPHOCYTES ABSOLUTE: 2.4 K/UL (ref 1–5.1)
LYMPHOCYTES RELATIVE PERCENT: 34.9 %
MCH RBC QN AUTO: 30.5 PG (ref 26–34)
MCHC RBC AUTO-ENTMCNC: 33.7 G/DL (ref 31–36)
MCV RBC AUTO: 90.6 FL (ref 80–100)
MONOCYTES ABSOLUTE: 0.7 K/UL (ref 0–1.3)
MONOCYTES RELATIVE PERCENT: 9.9 %
NEUTROPHILS ABSOLUTE: 3.5 K/UL (ref 1.7–7.7)
NEUTROPHILS RELATIVE PERCENT: 51.6 %
PDW BLD-RTO: 13.2 % (ref 12.4–15.4)
PLATELET # BLD: 298 K/UL (ref 135–450)
PMV BLD AUTO: 7.7 FL (ref 5–10.5)
POTASSIUM SERPL-SCNC: 4.5 MMOL/L (ref 3.5–5.1)
RBC # BLD: 4.89 M/UL (ref 4.2–5.9)
SODIUM BLD-SCNC: 143 MMOL/L (ref 136–145)
TOTAL PROTEIN: 7.2 G/DL (ref 6.4–8.2)
TRIGL SERPL-MCNC: 78 MG/DL (ref 0–150)
TSH REFLEX FT4: 0.44 UIU/ML (ref 0.27–4.2)
VLDLC SERPL CALC-MCNC: 16 MG/DL
WBC # BLD: 6.8 K/UL (ref 4–11)

## 2022-08-31 PROCEDURE — 36415 COLL VENOUS BLD VENIPUNCTURE: CPT | Performed by: FAMILY MEDICINE

## 2022-08-31 PROCEDURE — 99214 OFFICE O/P EST MOD 30 MIN: CPT | Performed by: FAMILY MEDICINE

## 2022-08-31 PROCEDURE — 1123F ACP DISCUSS/DSCN MKR DOCD: CPT | Performed by: FAMILY MEDICINE

## 2022-08-31 PROCEDURE — G8427 DOCREV CUR MEDS BY ELIG CLIN: HCPCS | Performed by: FAMILY MEDICINE

## 2022-08-31 PROCEDURE — G8417 CALC BMI ABV UP PARAM F/U: HCPCS | Performed by: FAMILY MEDICINE

## 2022-08-31 PROCEDURE — 1036F TOBACCO NON-USER: CPT | Performed by: FAMILY MEDICINE

## 2022-08-31 PROCEDURE — 3017F COLORECTAL CA SCREEN DOC REV: CPT | Performed by: FAMILY MEDICINE

## 2022-08-31 RX ORDER — TADALAFIL 20 MG/1
20 TABLET ORAL PRN
Qty: 30 TABLET | Refills: 0 | Status: SHIPPED | OUTPATIENT
Start: 2022-08-31 | End: 2022-09-02

## 2022-08-31 RX ORDER — HYDROXYZINE HYDROCHLORIDE 25 MG/1
TABLET, FILM COATED ORAL
Qty: 270 TABLET | Refills: 1 | Status: SHIPPED | OUTPATIENT
Start: 2022-08-31

## 2022-08-31 RX ORDER — PANTOPRAZOLE SODIUM 20 MG/1
20 TABLET, DELAYED RELEASE ORAL DAILY
Qty: 30 TABLET | Refills: 3 | Status: SHIPPED | OUTPATIENT
Start: 2022-08-31

## 2022-08-31 NOTE — PROGRESS NOTES
2022     Roula Rios (:  1955) is a 69 y.o. male, here for evaluation of the following medical concerns:    HPI    Patient presented today for follow up concerning chronic medical conditions. 1.  Hypothyroidism- on medication for over one year, patient had had this checked for sometime, is on 125 mcg, tolerates well, needs this checked today, denied fatigue, tremors etc.. takes atarax as a side effect to the synthroid medication. 2.  GERD- patient is well controlled on Pepcid, no side effects tolerates medication well. Reduced his Pepcid to 40 mg at night, down from 40 mg BID. 3. Screening- patient wants flu shot today. 4.. hyperlipididemia- patient was supposed to diet and exercise in an attempt to lose weight, he has lost 3 pounds,  patient will have lipids checked today. History of high cholesterol, father passed away or CAD. 5.  Bilateral knee pain- years, would like to try a medication at this time. Will increase his medication. 6.  Erectile dysfunction- will need to see Urologist but declined at this time,  last PSA was 1. Today, chest pain, sob, n, v,or diarrhea. Review of Systems   Constitutional:  Positive for fatigue. Negative for activity change, fever and unexpected weight change. HENT:  Negative for congestion, ear pain, facial swelling, hearing loss, rhinorrhea, sinus pressure, sore throat and trouble swallowing. Eyes:  Negative for visual disturbance. Respiratory:  Negative for shortness of breath. Cardiovascular:  Negative for chest pain, palpitations and leg swelling. Gastrointestinal:  Negative for abdominal pain, anal bleeding, blood in stool, diarrhea, nausea and vomiting. Endocrine: Negative for cold intolerance, heat intolerance, polydipsia and polyphagia. Musculoskeletal:  Positive for arthralgias. Skin:  Negative for rash. Neurological:  Negative for dizziness, syncope, light-headedness and headaches. Metabolic Panel  - CBC with Auto Differential  - Lipid Panel  - TSH with Reflex to FT4  - AFL - Gladis Turner MD, Urology, [facility]    Return in about 6 months (around 2/28/2023).

## 2022-09-02 RX ORDER — TADALAFIL 20 MG/1
TABLET ORAL
Qty: 30 TABLET | Refills: 0 | Status: SHIPPED | OUTPATIENT
Start: 2022-09-02

## 2022-09-02 NOTE — TELEPHONE ENCOUNTER
Last office visit 8/31/2022     Last written     Next office visit scheduled 9/12/2022    Requested Prescriptions     Pending Prescriptions Disp Refills    tadalafil (CIALIS) 20 MG tablet [Pharmacy Med Name: TADALAFIL 20 MG TABLET] 30 tablet 0     Sig: TAKE ONE TABLET BY MOUTH AS NEEDED FOR ERECTILE DYSFUNCTION

## 2022-09-05 ASSESSMENT — ENCOUNTER SYMPTOMS
SHORTNESS OF BREATH: 0
ANAL BLEEDING: 0
TROUBLE SWALLOWING: 0
SINUS PRESSURE: 0
NAUSEA: 0
FACIAL SWELLING: 0
SORE THROAT: 0
VOMITING: 0
DIARRHEA: 0
BLOOD IN STOOL: 0
ABDOMINAL PAIN: 0
RHINORRHEA: 0

## 2022-09-12 ENCOUNTER — TELEMEDICINE (OUTPATIENT)
Dept: FAMILY MEDICINE CLINIC | Age: 67
End: 2022-09-12
Payer: MEDICARE

## 2022-09-12 DIAGNOSIS — Z00.00 MEDICARE ANNUAL WELLNESS VISIT, SUBSEQUENT: Primary | ICD-10-CM

## 2022-09-12 DIAGNOSIS — Z23 NEED FOR PROPHYLACTIC VACCINATION AND INOCULATION AGAINST VARICELLA: ICD-10-CM

## 2022-09-12 PROCEDURE — G0439 PPPS, SUBSEQ VISIT: HCPCS | Performed by: FAMILY MEDICINE

## 2022-09-12 PROCEDURE — 3017F COLORECTAL CA SCREEN DOC REV: CPT | Performed by: FAMILY MEDICINE

## 2022-09-12 PROCEDURE — 1123F ACP DISCUSS/DSCN MKR DOCD: CPT | Performed by: FAMILY MEDICINE

## 2022-09-12 ASSESSMENT — PATIENT HEALTH QUESTIONNAIRE - PHQ9
SUM OF ALL RESPONSES TO PHQ QUESTIONS 1-9: 0
SUM OF ALL RESPONSES TO PHQ9 QUESTIONS 1 & 2: 0
SUM OF ALL RESPONSES TO PHQ QUESTIONS 1-9: 0
1. LITTLE INTEREST OR PLEASURE IN DOING THINGS: 0
SUM OF ALL RESPONSES TO PHQ QUESTIONS 1-9: 0
SUM OF ALL RESPONSES TO PHQ QUESTIONS 1-9: 0
2. FEELING DOWN, DEPRESSED OR HOPELESS: 0

## 2022-09-12 ASSESSMENT — LIFESTYLE VARIABLES
HOW MANY STANDARD DRINKS CONTAINING ALCOHOL DO YOU HAVE ON A TYPICAL DAY: PATIENT DOES NOT DRINK
HOW OFTEN DO YOU HAVE A DRINK CONTAINING ALCOHOL: NEVER

## 2022-09-12 NOTE — PROGRESS NOTES
Medicare Annual Wellness Visit    Edward Jasso is here for [facility] AWV    Assessment & Plan    Recommendations for Preventive Services Due: see orders and patient instructions/AVS.  Recommended screening schedule for the next 5-10 years is provided to the patient in written form: see Patient Instructions/AVS.     Return for Medicare Annual Wellness Visit in 1 year. Subjective   The following acute and/or chronic problems were also addressed today:  labs    Patient's complete Health Risk Assessment and screening values have been reviewed and are found in Flowsheets. The following problems were reviewed today and where indicated follow up appointments were made and/or referrals ordered.     Positive Risk Factor Screenings with Interventions:             General Health and ACP:  General  In general, how would you say your health is?: Fair  In the past 7 days, have you experienced any of the following: New or Increased Pain, New or Increased Fatigue, Loneliness, Social Isolation, Stress or Anger?: No  Do you get the social and emotional support that you need?: Yes  Do you have a Living Will?: (!) No    Advance Directives       Power of [address] [place] [address] Will ACP-Advance Directive ACP-Power of     Not on File Not on File Not on File Not on File        General Health Risk Interventions:  No Living Will: Patient declines ACP discussion/assistance    Health Habits/Nutrition:  Physical Activity: Insufficiently Active    Days of Exercise per Week: 2 days    Minutes of Exercise per Session: 20 min     Have you lost any weight without trying in the past 3 months?: No     Have you seen the dentist within the past year?: (!) No  Health Habits/Nutrition Interventions:  Dental exam overdue:  patient encouraged to make appointment with his/her dentist    Hearing/Vision:  Do you or your family notice any trouble with your hearing that hasn't been managed with hearing aids?: No  Do you have difficulty driving, watching TV, or doing any of your daily activities because of your eyesight?: No  Have you had an eye exam within the past year?: (!) No  No results found. Hearing/Vision Interventions:  No problems at this time    Safety:  Do you have working smoke detectors?: Yes  Do you have any tripping hazards - loose or unsecured carpets or rugs?: No  Do you have any tripping hazards - clutter in doorways, halls, or stairs?: No  Do you have either shower bars, grab bars, non-slip mats or non-slip surfaces in your shower or bathtub?: (!) No  Do all of your stairways have a railing or banister?: Not Applicable  Do you always fasten your seatbelt when you are in a car?: Yes  Safety Interventions:  Patient declines any further evaluation/treatment for this issue           Objective      Patient-Reported Vitals  No data recorded   No PE       No Known Allergies  Prior to Visit Medications    Medication Sig Taking?  Authorizing Provider   zoster recombinant adjuvanted vaccine [facility]) 50 MCG/0.5ML SUSR injection Inject 0.5 mLs into the muscle once for 1 dose Yes Ethan Perez, DO   tadalafil (CIALIS) 20 MG tablet TAKE ONE TABLET BY MOUTH AS NEEDED FOR ERECTILE DYSFUNCTION Yes Ethan Perez DO   pantoprazole (PROTONIX) 20 MG tablet Take 1 tablet by mouth daily Yes Ethan Perez, DO   hydrOXYzine HCl (ATARAX) 25 MG tablet TAKE 1 TABLET BY MOUTH THREE TIMES A DAY FOR ITCHING Yes Ethan Perez DO   meloxicam (MOBIC) 15 MG tablet TAKE 1 TABLET BY MOUTH EVERY DAY Yes Ethan Perez DO   levothyroxine (SYNTHROID) 125 MCG tablet TAKE 1 TABLET BY MOUTH DAILY Yes Ethan Perez, DO   famotidine (PEPCID) 40 MG tablet Take 0.5 tablets by mouth 2 times daily Yes Ethan Perez DO   ibuprofen (ADVIL;MOTRIN) 200 MG tablet Take 200-400 mg by mouth every 6 hours as needed for Pain Yes Historical Provider, MD Vivar (Including outside providers/suppliers regularly involved in providing care):   Patient Care Team:  Leonard Anthony DO as PCP - General (Family Medicine)  Celeste Read DO as PCP - [facility] Empaneled Provider     Reviewed and updated this visit:  Tobacco  Allergies  Meds  Problems  Med Hx  Surg Hx  Soc Hx  Fam Hx            Juanita Ruff, was evaluated through a synchronous (real-time) audio-video encounter. The patient (or guardian if applicable) is aware that this is a billable service, which includes applicable co-pays. This Virtual Visit was conducted with patient's (and/or legal guardian's) consent. The visit was conducted pursuant to the emergency declaration under the [address] [place] [address], [address] [place] [address] [facility] authority and the [employer] General Act. Patient identification was verified, and a caregiver was present when appropriate. The patient was located at Home: [place] [address]. Provider was located at Home ([place] [address]): [place].

## 2022-09-12 NOTE — PATIENT INSTRUCTIONS
Personalized Preventive Plan for Yessi Drexel - 9/12/2022  Medicare offers a range of preventive health benefits. Some of the tests and screenings are paid in full while other may be subject to a deductible, co-insurance, and/or copay. Some of these benefits include a comprehensive review of your medical history including lifestyle, illnesses that may run in your family, and various assessments and screenings as appropriate. After reviewing your medical record and screening and assessments performed today your provider may have ordered immunizations, labs, imaging, and/or referrals for you. A list of these orders (if applicable) as well as your Preventive Care list are included within your After Visit Summary for your review. Other Preventive Recommendations:    A preventive eye exam performed by an eye specialist is recommended every 1-2 years to screen for glaucoma; cataracts, macular degeneration, and other eye disorders. A preventive dental visit is recommended every 6 months. Try to get at least 150 minutes of exercise per week or 10,000 steps per day on a pedometer . Order or download the FREE \"Exercise & Physical Activity: Your Everyday Guide\" from [employer]. Call 0-838.249.5577 or search [employer] online. You need 2242-9201 mg of calcium and 6908-9940 IU of vitamin D per day. It is possible to meet your calcium requirement with diet alone, but a vitamin D supplement is usually necessary to meet this goal.  When exposed to the sun, use a sunscreen that protects against both UVA and UVB radiation with an SPF of 30 or greater. Reapply every 2 to 3 hours or after sweating, drying off with a towel, or swimming. Always wear a seat belt when traveling in a car. Always wear a helmet when riding a bicycle or motorcycle.

## 2022-10-04 RX ORDER — LEVOTHYROXINE SODIUM 0.12 MG/1
TABLET ORAL
Qty: 90 TABLET | Refills: 1 | Status: SHIPPED | OUTPATIENT
Start: 2022-10-04

## 2022-10-04 NOTE — TELEPHONE ENCOUNTER
Last office visit 9/12/2022     Last written     Next office visit scheduled 3/1/2023    Requested Prescriptions     Pending Prescriptions Disp Refills    levothyroxine (SYNTHROID) 125 MCG tablet [Pharmacy Med Name: LEVOTHYROXINE 125 MCG TABLET] 90 tablet 1     Sig: TAKE 1 TABLET BY MOUTH EVERY DAY

## 2022-11-17 DIAGNOSIS — K21.9 GASTROESOPHAGEAL REFLUX DISEASE WITHOUT ESOPHAGITIS: Chronic | ICD-10-CM

## 2022-11-17 DIAGNOSIS — M17.0 OSTEOARTHRITIS OF BOTH KNEES, UNSPECIFIED OSTEOARTHRITIS TYPE: ICD-10-CM

## 2022-11-17 DIAGNOSIS — E78.2 MIXED HYPERLIPIDEMIA: ICD-10-CM

## 2022-11-17 DIAGNOSIS — E07.9 THYROID DISEASE: ICD-10-CM

## 2022-11-18 RX ORDER — MELOXICAM 15 MG/1
TABLET ORAL
Qty: 90 TABLET | Refills: 1 | Status: SHIPPED | OUTPATIENT
Start: 2022-11-18

## 2023-01-23 RX ORDER — PANTOPRAZOLE SODIUM 20 MG/1
TABLET, DELAYED RELEASE ORAL
Qty: 30 TABLET | Refills: 3 | Status: SHIPPED | OUTPATIENT
Start: 2023-01-23

## 2023-03-08 ENCOUNTER — OFFICE VISIT (OUTPATIENT)
Dept: FAMILY MEDICINE CLINIC | Age: 68
End: 2023-03-08

## 2023-03-08 VITALS
BODY MASS INDEX: 30.76 KG/M2 | WEIGHT: 196 LBS | DIASTOLIC BLOOD PRESSURE: 88 MMHG | SYSTOLIC BLOOD PRESSURE: 130 MMHG | HEIGHT: 67 IN

## 2023-03-08 DIAGNOSIS — Z12.5 SCREENING PSA (PROSTATE SPECIFIC ANTIGEN): Primary | ICD-10-CM

## 2023-03-08 DIAGNOSIS — E78.2 MIXED HYPERLIPIDEMIA: ICD-10-CM

## 2023-03-08 DIAGNOSIS — K21.9 GASTROESOPHAGEAL REFLUX DISEASE WITHOUT ESOPHAGITIS: Chronic | ICD-10-CM

## 2023-03-08 DIAGNOSIS — E07.9 THYROID DISEASE: ICD-10-CM

## 2023-03-08 LAB
A/G RATIO: 1.3 (ref 1.1–2.2)
ALBUMIN SERPL-MCNC: 4 G/DL (ref 3.4–5)
ALP BLD-CCNC: 63 U/L (ref 40–129)
ALT SERPL-CCNC: 18 U/L (ref 10–40)
ANION GAP SERPL CALCULATED.3IONS-SCNC: 10 MMOL/L (ref 3–16)
AST SERPL-CCNC: 18 U/L (ref 15–37)
BASOPHILS ABSOLUTE: 0 K/UL (ref 0–0.2)
BASOPHILS RELATIVE PERCENT: 0.7 %
BILIRUB SERPL-MCNC: 0.4 MG/DL (ref 0–1)
BUN BLDV-MCNC: 22 MG/DL (ref 7–20)
CALCIUM SERPL-MCNC: 9.3 MG/DL (ref 8.3–10.6)
CHLORIDE BLD-SCNC: 104 MMOL/L (ref 99–110)
CHOLESTEROL, TOTAL: 185 MG/DL (ref 0–199)
CO2: 26 MMOL/L (ref 21–32)
CREAT SERPL-MCNC: 0.8 MG/DL (ref 0.8–1.3)
EOSINOPHILS ABSOLUTE: 0.3 K/UL (ref 0–0.6)
EOSINOPHILS RELATIVE PERCENT: 4.4 %
GFR SERPL CREATININE-BSD FRML MDRD: >60 ML/MIN/{1.73_M2}
GLUCOSE BLD-MCNC: 109 MG/DL (ref 70–99)
HCT VFR BLD CALC: 43.7 % (ref 40.5–52.5)
HDLC SERPL-MCNC: 36 MG/DL (ref 40–60)
HEMOGLOBIN: 14.5 G/DL (ref 13.5–17.5)
LDL CHOLESTEROL CALCULATED: 126 MG/DL
LYMPHOCYTES ABSOLUTE: 2.2 K/UL (ref 1–5.1)
LYMPHOCYTES RELATIVE PERCENT: 36.3 %
MCH RBC QN AUTO: 30.1 PG (ref 26–34)
MCHC RBC AUTO-ENTMCNC: 33.2 G/DL (ref 31–36)
MCV RBC AUTO: 90.6 FL (ref 80–100)
MONOCYTES ABSOLUTE: 0.6 K/UL (ref 0–1.3)
MONOCYTES RELATIVE PERCENT: 9.9 %
NEUTROPHILS ABSOLUTE: 3 K/UL (ref 1.7–7.7)
NEUTROPHILS RELATIVE PERCENT: 48.7 %
PDW BLD-RTO: 12.7 % (ref 12.4–15.4)
PLATELET # BLD: 325 K/UL (ref 135–450)
PMV BLD AUTO: 7.8 FL (ref 5–10.5)
POTASSIUM SERPL-SCNC: 4.6 MMOL/L (ref 3.5–5.1)
PROSTATE SPECIFIC ANTIGEN: 1.05 NG/ML (ref 0–4)
RBC # BLD: 4.82 M/UL (ref 4.2–5.9)
SODIUM BLD-SCNC: 140 MMOL/L (ref 136–145)
TOTAL PROTEIN: 7.2 G/DL (ref 6.4–8.2)
TRIGL SERPL-MCNC: 113 MG/DL (ref 0–150)
VLDLC SERPL CALC-MCNC: 23 MG/DL
WBC # BLD: 6.1 K/UL (ref 4–11)

## 2023-03-08 RX ORDER — PANTOPRAZOLE SODIUM 20 MG/1
TABLET, DELAYED RELEASE ORAL
Qty: 90 TABLET | Refills: 3 | Status: SHIPPED | OUTPATIENT
Start: 2023-03-08

## 2023-03-08 RX ORDER — HYDROXYZINE HYDROCHLORIDE 25 MG/1
TABLET, FILM COATED ORAL
Qty: 270 TABLET | Refills: 3 | Status: SHIPPED | OUTPATIENT
Start: 2023-03-08

## 2023-03-08 RX ORDER — TADALAFIL 20 MG/1
TABLET ORAL
Qty: 30 TABLET | Refills: 2 | Status: SHIPPED | OUTPATIENT
Start: 2023-03-08

## 2023-03-08 RX ORDER — LEVOTHYROXINE SODIUM 0.12 MG/1
TABLET ORAL
Qty: 90 TABLET | Refills: 3 | Status: SHIPPED | OUTPATIENT
Start: 2023-03-08

## 2023-03-08 ASSESSMENT — PATIENT HEALTH QUESTIONNAIRE - PHQ9
SUM OF ALL RESPONSES TO PHQ QUESTIONS 1-9: 0
SUM OF ALL RESPONSES TO PHQ9 QUESTIONS 1 & 2: 0
2. FEELING DOWN, DEPRESSED OR HOPELESS: 0
SUM OF ALL RESPONSES TO PHQ QUESTIONS 1-9: 0
1. LITTLE INTEREST OR PLEASURE IN DOING THINGS: 0

## 2023-03-08 NOTE — PROGRESS NOTES
3/8/2023     Kleber Gibbons (:  1955) is a 69 y.o. male, here for evaluation of the following medical concerns:    HPI  Patient presented today for follow up concerning chronic medical conditions. 1.  Hypothyroidism- on medication for over one year, patient had had this checked for sometime, is on 125 mcg, tolerates well, needs this checked today, denied fatigue, tremors etc.. takes atarax as a side effect to the synthroid medication. 2.  GERD- patient is well controlled on Pepcid, no side effects tolerates medication well. Reduced his Pepcid to 40 mg at night, down from 40 mg BID. 3.  PSA screening     4.. hyperlipididemia- patient was supposed to diet and exercise in an attempt to lose weight, he has lost 3 pounds,  patient will have lipids checked today. History of high cholesterol, father passed away or CAD. 5.  Bilateral knee pain- years, would like to try a medication at this time. Will increase his medication. 6.  Erectile dysfunction- will need to see Urologist but declined at this time,  last PSA was 1. Today, chest pain, sob, n, v,or diarrhea. Review of Systems   Constitutional:  Negative for activity change, fatigue, fever and unexpected weight change. HENT:  Negative for congestion, ear pain, facial swelling, hearing loss, rhinorrhea, sinus pressure, sore throat and trouble swallowing. Respiratory:  Negative for cough, chest tightness, shortness of breath and wheezing. Cardiovascular:  Negative for chest pain, palpitations and leg swelling. Gastrointestinal:  Negative for abdominal pain, anal bleeding, blood in stool, diarrhea, nausea and vomiting. Endocrine: Negative for cold intolerance, heat intolerance, polydipsia and polyphagia. Musculoskeletal:  Negative for arthralgias. Skin:  Negative for rash. Allergic/Immunologic: Negative for food allergies. Neurological:  Negative for dizziness, syncope, light-headedness and headaches. Psychiatric/Behavioral:  Negative for dysphoric mood. The patient is not nervous/anxious. Prior to Visit Medications    Medication Sig Taking? Authorizing Provider   levothyroxine (SYNTHROID) 125 MCG tablet TAKE 1 TABLET BY MOUTH EVERY DAY Yes Ethan Perez, DO   pantoprazole (PROTONIX) 20 MG tablet TAKE 1 TABLET BY MOUTH EVERY DAY Yes Ethan Perez, DO   hydrOXYzine HCl (ATARAX) 25 MG tablet TAKE 1 TABLET BY MOUTH THREE TIMES A DAY FOR ITCHING Yes Ethan Perez, DO   tadalafil (CIALIS) 20 MG tablet TAKE ONE TABLET BY MOUTH AS NEEDED FOR ERECTILE DYSFUNCTION Yes Ethan Perez, DO   meloxicam (MOBIC) 15 MG tablet TAKE 1 TABLET BY MOUTH EVERY DAY Yes Ethan Perez, DO   ibuprofen (ADVIL;MOTRIN) 200 MG tablet Take 200-400 mg by mouth every 6 hours as needed for Pain Yes Historical Provider, MD        Social History     Tobacco Use    Smoking status: Former     Packs/day: 1.00     Years: 24.00     Pack years: 24.00     Types: Cigarettes     Quit date: 1990     Years since quittin.2    Smokeless tobacco: Never    Tobacco comments:     quit smoking over 20 years ago   Substance Use Topics    Alcohol use: Not Currently     Comment: social        Vitals:    23 0844   BP: 130/88   Weight: 196 lb (88.9 kg)   Height: 5' 7\" (1.702 m)     Estimated body mass index is 30.7 kg/m² as calculated from the following:    Height as of this encounter: 5' 7\" (1.702 m). Weight as of this encounter: 196 lb (88.9 kg). Physical Exam  Vitals and nursing note reviewed. Constitutional:       Appearance: He is well-developed. HENT:      Head: Normocephalic and atraumatic. Right Ear: External ear normal.      Left Ear: External ear normal.   Eyes:      Pupils: Pupils are equal, round, and reactive to light. Neck:      Thyroid: No thyromegaly. Cardiovascular:      Rate and Rhythm: Normal rate and regular rhythm. Heart sounds: No murmur heard.   Pulmonary:      Effort: Pulmonary effort is normal. Breath sounds: Normal breath sounds. No wheezing or rales. Abdominal:      General: Bowel sounds are normal.      Palpations: Abdomen is soft. Tenderness: There is no abdominal tenderness. Musculoskeletal:         General: Normal range of motion. Cervical back: Neck supple. Lymphadenopathy:      Cervical: No cervical adenopathy. Skin:     Findings: No rash. Neurological:      Mental Status: He is alert and oriented to person, place, and time. Psychiatric:         Behavior: Behavior normal.         Judgment: Judgment normal.       ASSESSMENT/PLAN:  1. Screening PSA (prostate specific antigen)  Lab obtained  ED medication provided    2. Thyroid disease  Stable  Continue with medication  Answered questions     3. Gastroesophageal reflux disease without esophagitis  Take medication as prescribed. Raise head of bed. Avoid trigger foods. Discussed side effects of the medication. Please RTC if not improved. 4. Mixed hyperlipidemia  Take medication as prescribed. Discussed the need to exercise 30 minutes each day. Monitor diet, avoid fried foods etc... Educated on need to reduce cholesterol in diet and results of poor diet. Return in about 3 months (around 6/8/2023).

## 2023-03-11 DIAGNOSIS — E78.2 MIXED HYPERLIPIDEMIA: ICD-10-CM

## 2023-03-11 DIAGNOSIS — M17.0 OSTEOARTHRITIS OF BOTH KNEES, UNSPECIFIED OSTEOARTHRITIS TYPE: ICD-10-CM

## 2023-03-11 DIAGNOSIS — E07.9 THYROID DISEASE: ICD-10-CM

## 2023-03-11 DIAGNOSIS — K21.9 GASTROESOPHAGEAL REFLUX DISEASE WITHOUT ESOPHAGITIS: Chronic | ICD-10-CM

## 2023-03-11 ASSESSMENT — ENCOUNTER SYMPTOMS
VOMITING: 0
SHORTNESS OF BREATH: 0
DIARRHEA: 0
ABDOMINAL PAIN: 0
SINUS PRESSURE: 0
TROUBLE SWALLOWING: 0
ANAL BLEEDING: 0
COUGH: 0
SORE THROAT: 0
BLOOD IN STOOL: 0
WHEEZING: 0
RHINORRHEA: 0
NAUSEA: 0
CHEST TIGHTNESS: 0
FACIAL SWELLING: 0

## 2023-03-13 RX ORDER — HYDROXYZINE HYDROCHLORIDE 25 MG/1
TABLET, FILM COATED ORAL
Qty: 270 TABLET | Refills: 3 | Status: SHIPPED | OUTPATIENT
Start: 2023-03-13

## 2023-03-13 RX ORDER — MELOXICAM 15 MG/1
15 TABLET ORAL DAILY
Qty: 90 TABLET | Refills: 1 | Status: SHIPPED | OUTPATIENT
Start: 2023-03-13

## 2023-03-13 NOTE — TELEPHONE ENCOUNTER
Requested Prescriptions     Pending Prescriptions Disp Refills    hydrOXYzine HCl (ATARAX) 25 MG tablet 270 tablet 3     Sig: TAKE 1 TABLET BY MOUTH THREE TIMES A DAY FOR ITCHING     Last ov 3/8/2023   Last lab 3/8/23  Next f/u 9/8/23

## 2023-03-13 NOTE — TELEPHONE ENCOUNTER
Requested Prescriptions     Pending Prescriptions Disp Refills    meloxicam (MOBIC) 15 MG tablet 90 tablet 1     Last ov 3/8/2023   Last lab 3/8/23  Next f/u 9/8/23

## 2023-04-05 RX ORDER — LEVOTHYROXINE SODIUM 0.12 MG/1
TABLET ORAL
Qty: 90 TABLET | Refills: 1 | Status: SHIPPED | OUTPATIENT
Start: 2023-04-05

## 2023-04-05 NOTE — TELEPHONE ENCOUNTER
Last office visit 3/8/2023     Last written      Next office visit scheduled 9/8/2023    Requested Prescriptions     Pending Prescriptions Disp Refills    levothyroxine (SYNTHROID) 125 MCG tablet [Pharmacy Med Name: LEVOTHYROXINE 125 MCG TABLET] 90 tablet 1     Sig: TAKE 1 TABLET BY MOUTH EVERY DAY

## 2023-06-22 RX ORDER — HYDROXYZINE HYDROCHLORIDE 25 MG/1
TABLET, FILM COATED ORAL
Qty: 270 TABLET | Refills: 3 | Status: SHIPPED | OUTPATIENT
Start: 2023-06-22

## 2023-06-22 NOTE — TELEPHONE ENCOUNTER
Last office visit 3/8/2023       Next office visit scheduled 9/8/2023    Requested Prescriptions     Pending Prescriptions Disp Refills    hydrOXYzine HCl (ATARAX) 25 MG tablet 270 tablet 3     Sig: TAKE 1 TABLET BY MOUTH THREE TIMES A DAY FOR ITCHING

## 2023-06-23 ENCOUNTER — TELEPHONE (OUTPATIENT)
Dept: ADMINISTRATIVE | Age: 68
End: 2023-06-23

## 2023-09-08 ENCOUNTER — OFFICE VISIT (OUTPATIENT)
Dept: FAMILY MEDICINE CLINIC | Age: 68
End: 2023-09-08

## 2023-09-08 VITALS
WEIGHT: 190.2 LBS | DIASTOLIC BLOOD PRESSURE: 78 MMHG | SYSTOLIC BLOOD PRESSURE: 114 MMHG | BODY MASS INDEX: 29.85 KG/M2 | HEIGHT: 67 IN

## 2023-09-08 DIAGNOSIS — J30.9 ALLERGIC RHINITIS, UNSPECIFIED SEASONALITY, UNSPECIFIED TRIGGER: ICD-10-CM

## 2023-09-08 DIAGNOSIS — E78.2 MIXED HYPERLIPIDEMIA: ICD-10-CM

## 2023-09-08 DIAGNOSIS — R05.1 ACUTE COUGH: ICD-10-CM

## 2023-09-08 DIAGNOSIS — K21.9 GASTROESOPHAGEAL REFLUX DISEASE WITHOUT ESOPHAGITIS: Chronic | ICD-10-CM

## 2023-09-08 DIAGNOSIS — E07.9 THYROID DISEASE: ICD-10-CM

## 2023-09-08 DIAGNOSIS — M17.0 OSTEOARTHRITIS OF BOTH KNEES, UNSPECIFIED OSTEOARTHRITIS TYPE: ICD-10-CM

## 2023-09-08 DIAGNOSIS — K21.9 GASTROESOPHAGEAL REFLUX DISEASE WITHOUT ESOPHAGITIS: Primary | Chronic | ICD-10-CM

## 2023-09-08 RX ORDER — BENZONATATE 200 MG/1
200 CAPSULE ORAL 3 TIMES DAILY PRN
Qty: 30 CAPSULE | Refills: 0 | Status: SHIPPED | OUTPATIENT
Start: 2023-09-08 | End: 2023-09-18

## 2023-09-08 RX ORDER — PANTOPRAZOLE SODIUM 40 MG/1
40 TABLET, DELAYED RELEASE ORAL
Qty: 90 TABLET | Refills: 1 | Status: SHIPPED | OUTPATIENT
Start: 2023-09-08

## 2023-09-08 RX ORDER — LORATADINE 10 MG/1
10 TABLET ORAL DAILY
Qty: 30 TABLET | Refills: 0 | Status: SHIPPED | OUTPATIENT
Start: 2023-09-08 | End: 2023-10-08

## 2023-09-08 RX ORDER — MELOXICAM 15 MG/1
TABLET ORAL
Qty: 90 TABLET | Refills: 1 | Status: SHIPPED | OUTPATIENT
Start: 2023-09-08

## 2023-09-08 SDOH — ECONOMIC STABILITY: FOOD INSECURITY: WITHIN THE PAST 12 MONTHS, THE FOOD YOU BOUGHT JUST DIDN'T LAST AND YOU DIDN'T HAVE MONEY TO GET MORE.: NEVER TRUE

## 2023-09-08 SDOH — ECONOMIC STABILITY: HOUSING INSECURITY
IN THE LAST 12 MONTHS, WAS THERE A TIME WHEN YOU DID NOT HAVE A STEADY PLACE TO SLEEP OR SLEPT IN A SHELTER (INCLUDING NOW)?: NO

## 2023-09-08 SDOH — ECONOMIC STABILITY: INCOME INSECURITY: HOW HARD IS IT FOR YOU TO PAY FOR THE VERY BASICS LIKE FOOD, HOUSING, MEDICAL CARE, AND HEATING?: SOMEWHAT HARD

## 2023-09-08 SDOH — ECONOMIC STABILITY: FOOD INSECURITY: WITHIN THE PAST 12 MONTHS, YOU WORRIED THAT YOUR FOOD WOULD RUN OUT BEFORE YOU GOT MONEY TO BUY MORE.: NEVER TRUE

## 2023-09-08 NOTE — TELEPHONE ENCOUNTER
Last office visit 3/8/2023       Next office visit scheduled 9/8/2023    Requested Prescriptions     Pending Prescriptions Disp Refills    meloxicam (MOBIC) 15 MG tablet [Pharmacy Med Name: MELOXICAM 15 MG TABLET] 90 tablet 1     Sig: TAKE ONE TABLET BY MOUTH DAILY

## 2023-09-10 PROBLEM — S61.213A LACERATION OF LEFT MIDDLE FINGER WITHOUT FOREIGN BODY WITHOUT DAMAGE TO NAIL: Status: RESOLVED | Noted: 2020-08-17 | Resolved: 2023-09-10

## 2023-09-10 ASSESSMENT — ENCOUNTER SYMPTOMS
RHINORRHEA: 1
WHEEZING: 0
NAUSEA: 0
CHEST TIGHTNESS: 0
VOMITING: 0
FACIAL SWELLING: 0
SINUS PRESSURE: 0
COUGH: 1
SORE THROAT: 0
DIARRHEA: 0
SHORTNESS OF BREATH: 0
TROUBLE SWALLOWING: 0
ABDOMINAL PAIN: 0
EYE DISCHARGE: 0

## 2023-10-05 ENCOUNTER — TELEMEDICINE (OUTPATIENT)
Dept: FAMILY MEDICINE CLINIC | Age: 68
End: 2023-10-05

## 2023-10-05 DIAGNOSIS — Z00.00 MEDICARE ANNUAL WELLNESS VISIT, SUBSEQUENT: Primary | ICD-10-CM

## 2023-10-05 RX ORDER — FLUTICASONE PROPIONATE 50 MCG
1 SPRAY, SUSPENSION (ML) NASAL DAILY
Qty: 32 G | Refills: 1 | Status: SHIPPED | OUTPATIENT
Start: 2023-10-05

## 2023-10-05 RX ORDER — LORATADINE 10 MG/1
10 TABLET ORAL DAILY
Qty: 90 TABLET | Refills: 0 | Status: SHIPPED | OUTPATIENT
Start: 2023-10-05 | End: 2024-01-03

## 2023-10-05 ASSESSMENT — PATIENT HEALTH QUESTIONNAIRE - PHQ9
SUM OF ALL RESPONSES TO PHQ QUESTIONS 1-9: 0
SUM OF ALL RESPONSES TO PHQ QUESTIONS 1-9: 0
1. LITTLE INTEREST OR PLEASURE IN DOING THINGS: 0
SUM OF ALL RESPONSES TO PHQ QUESTIONS 1-9: 0
SUM OF ALL RESPONSES TO PHQ QUESTIONS 1-9: 0
SUM OF ALL RESPONSES TO PHQ9 QUESTIONS 1 & 2: 0
2. FEELING DOWN, DEPRESSED OR HOPELESS: 0

## 2023-10-05 ASSESSMENT — LIFESTYLE VARIABLES: HOW MANY STANDARD DRINKS CONTAINING ALCOHOL DO YOU HAVE ON A TYPICAL DAY: PATIENT DOES NOT DRINK

## 2023-10-05 NOTE — PROGRESS NOTES
TABLET BY MOUTH EVERY DAY  Ethan Perez DO   tadalafil (CIALIS) 20 MG tablet TAKE ONE TABLET BY MOUTH AS NEEDED FOR ERECTILE DYSFUNCTION  Ethan Perez DO   ibuprofen (ADVIL;MOTRIN) 200 MG tablet Take 1-2 tablets by mouth every 6 hours as needed for Pain  Provider, MD Cb Wise (Including outside providers/suppliers regularly involved in providing care):   Patient Care Team:  Chandan Olson DO as PCP - General (Family Medicine)  Chandan Olson DO as PCP - Empaneled Provider     Reviewed and updated this visit:        Documentation:    Elda Aguilar was evaluated through a synchronous (real-time) audio encounter. Patient identification was verified at the start of the visit. He (or guardian if applicable) is aware that this is a billable service, which includes applicable co-pays. This visit was conducted with the patient's (and/or legal guardian's) verbal consent. He has not had a related appointment within my department in the past 7 days or scheduled within the next 24 hours. The patient was located at Home: [address] [place] [id number]. The provider was located at [facility] [address] [place] [address],  [address] [place] [address]. Note: not billable if this call serves to triage the patient into an appointment for the relevant concern    Elda Aguilar is a 77 y.o. male evaluated via telephone on 10/5/2023 for Medicare AWV  . Chandan BlankenshipizzyDO Elda, was evaluated through a synchronous (real-time) audio-video encounter. The patient (or guardian if applicable) is aware that this is a billable service, which includes applicable co-pays. This Virtual Visit was conducted with patient's (and/or legal guardian's) consent. Patient identification was verified, and a caregiver was present when appropriate.    The patient was located at Home: [address] [place] [id number]  Provider was located at [facility] ([facility] Dept): [address] [place] [address]

## 2024-01-12 RX ORDER — LORATADINE 10 MG/1
10 TABLET ORAL DAILY
Qty: 90 TABLET | Refills: 0 | Status: SHIPPED | OUTPATIENT
Start: 2024-01-12

## 2024-01-12 NOTE — TELEPHONE ENCOUNTER
Last office visit 10/5/2023     Last written      Next office visit scheduled 3/8/2024    Requested Prescriptions     Pending Prescriptions Disp Refills    loratadine (CLARITIN) 10 MG tablet [Pharmacy Med Name: LORATADINE 10 MG TABLET] 90 tablet 0     Sig: TAKE 1 TABLET BY MOUTH DAILY

## 2024-01-24 ENCOUNTER — NURSE ONLY (OUTPATIENT)
Dept: FAMILY MEDICINE CLINIC | Age: 69
End: 2024-01-24
Payer: MEDICARE

## 2024-01-24 ENCOUNTER — TELEPHONE (OUTPATIENT)
Dept: FAMILY MEDICINE CLINIC | Age: 69
End: 2024-01-24

## 2024-01-24 DIAGNOSIS — B34.9 VIRAL ILLNESS: Primary | ICD-10-CM

## 2024-01-24 LAB
INFLUENZA A ANTIBODY: POSITIVE
INFLUENZA B ANTIBODY: ABNORMAL
S PYO AG THROAT QL: NORMAL

## 2024-01-24 PROCEDURE — 87804 INFLUENZA ASSAY W/OPTIC: CPT | Performed by: FAMILY MEDICINE

## 2024-01-24 PROCEDURE — 87880 STREP A ASSAY W/OPTIC: CPT | Performed by: FAMILY MEDICINE

## 2024-01-24 NOTE — TELEPHONE ENCOUNTER
Pt is coming in at 3 pm for a covid, flu and strep. He has a cough, congestion, fever, chills and sore throat. He will do a evist. If any question give pt a call [phone].

## 2024-01-24 NOTE — PROGRESS NOTES
Pt in for strep, flu and COVID testing only.     Symptoms with cough, s/t, fever, body aches.      Strep, flu performed, COVID sent      tolerated the procedure well with no complications    Pt advised to submit e-visit.

## 2024-01-25 LAB — SARS-COV-2 RNA RESP QL NAA+PROBE: NOT DETECTED

## 2024-02-13 ENCOUNTER — OFFICE VISIT (OUTPATIENT)
Dept: FAMILY MEDICINE CLINIC | Age: 69
End: 2024-02-13

## 2024-02-13 VITALS
WEIGHT: 190 LBS | DIASTOLIC BLOOD PRESSURE: 82 MMHG | HEIGHT: 67 IN | SYSTOLIC BLOOD PRESSURE: 112 MMHG | BODY MASS INDEX: 29.82 KG/M2

## 2024-02-13 DIAGNOSIS — E07.9 THYROID DISEASE: ICD-10-CM

## 2024-02-13 DIAGNOSIS — E78.2 MIXED HYPERLIPIDEMIA: Primary | ICD-10-CM

## 2024-02-13 DIAGNOSIS — K21.9 GASTROESOPHAGEAL REFLUX DISEASE WITHOUT ESOPHAGITIS: Chronic | ICD-10-CM

## 2024-02-13 LAB
BASOPHILS # BLD: 0 K/UL (ref 0–0.2)
BASOPHILS NFR BLD: 0.6 %
DEPRECATED RDW RBC AUTO: 12.4 % (ref 12.4–15.4)
EOSINOPHIL # BLD: 0.3 K/UL (ref 0–0.6)
EOSINOPHIL NFR BLD: 4 %
HCT VFR BLD AUTO: 42.5 % (ref 40.5–52.5)
HGB BLD-MCNC: 14.6 G/DL (ref 13.5–17.5)
LYMPHOCYTES # BLD: 2.4 K/UL (ref 1–5.1)
LYMPHOCYTES NFR BLD: 34.8 %
MCH RBC QN AUTO: 30.4 PG (ref 26–34)
MCHC RBC AUTO-ENTMCNC: 34.4 G/DL (ref 31–36)
MCV RBC AUTO: 88.3 FL (ref 80–100)
MONOCYTES # BLD: 0.8 K/UL (ref 0–1.3)
MONOCYTES NFR BLD: 11.6 %
NEUTROPHILS # BLD: 3.4 K/UL (ref 1.7–7.7)
NEUTROPHILS NFR BLD: 49 %
PLATELET # BLD AUTO: 340 K/UL (ref 135–450)
PMV BLD AUTO: 8.3 FL (ref 5–10.5)
RBC # BLD AUTO: 4.82 M/UL (ref 4.2–5.9)
WBC # BLD AUTO: 6.9 K/UL (ref 4–11)

## 2024-02-13 NOTE — PROGRESS NOTES
2024     Naresh Adkins (:  1955) is a 68 y.o. male, here for evaluation of the following medical concerns:    HPI  Patient presented today for follow up concerning chronic medical conditions.      1.  Hypothyroidism- on medication for over one year, patient had had this checked for sometime, is on 125 mcg, tolerates well, needs this checked today, denied fatigue, tremors etc.. takes atarax as a side effect to the synthroid medication.      2.  GERD- patient is well controlled on Pepcid, no side effects tolerates medication well.  Reduced his Pepcid to 40 mg at night, down from 40 mg BID.     3.  PSA screening     4.. hyperlipididemia- patient was supposed to diet and exercise in an attempt to lose weight, he has lost 3 pounds,  patient will have lipids checked today. History of high cholesterol, father passed away or CAD.      Today, chest pain, sob, n, v,or diarrhea.  Review of Systems   Constitutional:  Negative for activity change, fatigue, fever and unexpected weight change.   HENT:  Negative for congestion, ear pain, facial swelling, hearing loss, rhinorrhea, sinus pressure, sore throat and trouble swallowing.    Eyes:  Negative for discharge and visual disturbance.   Respiratory:  Negative for cough, chest tightness, shortness of breath and wheezing.    Cardiovascular:  Negative for chest pain, palpitations and leg swelling.   Gastrointestinal:  Negative for abdominal pain, anal bleeding, blood in stool, diarrhea, nausea and vomiting.   Endocrine: Negative for cold intolerance, heat intolerance, polydipsia and polyphagia.   Genitourinary:  Negative for decreased urine volume, dysuria, frequency, genital sores, penile discharge, penile pain, testicular pain and urgency.   Musculoskeletal:  Negative for arthralgias.   Skin:  Negative for rash.   Allergic/Immunologic: Negative for food allergies.   Neurological:  Negative for dizziness, syncope, light-headedness and headaches.   Hematological:

## 2024-02-14 LAB
ALBUMIN SERPL-MCNC: 4.2 G/DL (ref 3.4–5)
ALBUMIN/GLOB SERPL: 1.3 {RATIO} (ref 1.1–2.2)
ALP SERPL-CCNC: 64 U/L (ref 40–129)
ALT SERPL-CCNC: 18 U/L (ref 10–40)
ANION GAP SERPL CALCULATED.3IONS-SCNC: 11 MMOL/L (ref 3–16)
AST SERPL-CCNC: 22 U/L (ref 15–37)
BILIRUB SERPL-MCNC: 0.6 MG/DL (ref 0–1)
BUN SERPL-MCNC: 15 MG/DL (ref 7–20)
CALCIUM SERPL-MCNC: 8.7 MG/DL (ref 8.3–10.6)
CHLORIDE SERPL-SCNC: 104 MMOL/L (ref 99–110)
CHOLEST SERPL-MCNC: 201 MG/DL (ref 0–199)
CO2 SERPL-SCNC: 26 MMOL/L (ref 21–32)
CREAT SERPL-MCNC: 1.1 MG/DL (ref 0.8–1.3)
GFR SERPLBLD CREATININE-BSD FMLA CKD-EPI: >60 ML/MIN/{1.73_M2}
GLUCOSE SERPL-MCNC: 99 MG/DL (ref 70–99)
HDLC SERPL-MCNC: 38 MG/DL (ref 40–60)
LDLC SERPL CALC-MCNC: 141 MG/DL
POTASSIUM SERPL-SCNC: 4.3 MMOL/L (ref 3.5–5.1)
PROT SERPL-MCNC: 7.5 G/DL (ref 6.4–8.2)
PSA SERPL DL<=0.01 NG/ML-MCNC: 1.44 NG/ML (ref 0–4)
SODIUM SERPL-SCNC: 141 MMOL/L (ref 136–145)
T4 FREE SERPL-MCNC: 1.5 NG/DL (ref 0.9–1.8)
TRIGL SERPL-MCNC: 110 MG/DL (ref 0–150)
TSH SERPL DL<=0.005 MIU/L-ACNC: 0.17 UIU/ML (ref 0.27–4.2)
VLDLC SERPL CALC-MCNC: 22 MG/DL

## 2024-02-16 RX ORDER — LEVOTHYROXINE SODIUM 112 UG/1
112 TABLET ORAL DAILY
Qty: 90 TABLET | Refills: 1 | Status: SHIPPED | OUTPATIENT
Start: 2024-02-16

## 2024-03-07 RX ORDER — PANTOPRAZOLE SODIUM 40 MG/1
40 TABLET, DELAYED RELEASE ORAL
Qty: 90 TABLET | Refills: 0 | Status: SHIPPED | OUTPATIENT
Start: 2024-03-07

## 2024-03-07 NOTE — TELEPHONE ENCOUNTER
Last office visit 2/13/2024     Last written      Next office visit scheduled 8/13/2024    Requested Prescriptions     Pending Prescriptions Disp Refills    pantoprazole (PROTONIX) 40 MG tablet [Pharmacy Med Name: PANTOPRAZOLE SOD DR 40 MG TAB] 90 tablet 1     Sig: TAKE ONE TABLET BY MOUTH EVERY MORNING BEFORE BREAKFAST

## 2024-03-08 RX ORDER — TADALAFIL 20 MG/1
TABLET ORAL
Qty: 30 TABLET | Refills: 2 | Status: SHIPPED | OUTPATIENT
Start: 2024-03-08

## 2024-03-08 NOTE — TELEPHONE ENCOUNTER
Last office visit 2/13/2024       Next office visit scheduled 8/13/2024    Requested Prescriptions     Pending Prescriptions Disp Refills    tadalafil (CIALIS) 20 MG tablet [Pharmacy Med Name: TADALAFIL 20 MG TABLET] 30 tablet 2     Sig: TAKE ONE TABLET BY MOUTH AS NEEDED FOR ERECTILE DYSFUNCTION

## 2024-03-12 DIAGNOSIS — E07.9 THYROID DISEASE: ICD-10-CM

## 2024-03-12 DIAGNOSIS — M17.0 OSTEOARTHRITIS OF BOTH KNEES, UNSPECIFIED OSTEOARTHRITIS TYPE: ICD-10-CM

## 2024-03-12 DIAGNOSIS — E78.2 MIXED HYPERLIPIDEMIA: ICD-10-CM

## 2024-03-12 DIAGNOSIS — K21.9 GASTROESOPHAGEAL REFLUX DISEASE WITHOUT ESOPHAGITIS: Chronic | ICD-10-CM

## 2024-03-12 RX ORDER — MELOXICAM 15 MG/1
15 TABLET ORAL DAILY
Qty: 90 TABLET | Refills: 1 | Status: SHIPPED | OUTPATIENT
Start: 2024-03-12

## 2024-04-08 RX ORDER — LORATADINE 10 MG/1
10 TABLET ORAL DAILY
Qty: 90 TABLET | Refills: 0 | Status: SHIPPED | OUTPATIENT
Start: 2024-04-08

## 2024-04-08 NOTE — TELEPHONE ENCOUNTER
Last office visit 2/13/2024       Next office visit scheduled 8/13/2024    Requested Prescriptions     Pending Prescriptions Disp Refills    loratadine (CLARITIN) 10 MG tablet [Pharmacy Med Name: LORATADINE 10 MG TABLET] 90 tablet 0     Sig: TAKE 1 TABLET BY MOUTH DAILY

## 2024-06-10 RX ORDER — PANTOPRAZOLE SODIUM 40 MG/1
40 TABLET, DELAYED RELEASE ORAL
Qty: 90 TABLET | Refills: 0 | Status: SHIPPED | OUTPATIENT
Start: 2024-06-10

## 2024-06-24 RX ORDER — HYDROXYZINE HYDROCHLORIDE 25 MG/1
TABLET, FILM COATED ORAL
Qty: 270 TABLET | Refills: 3 | Status: SHIPPED | OUTPATIENT
Start: 2024-06-24

## 2024-06-24 NOTE — TELEPHONE ENCOUNTER
Last office visit 2/13/2024       Next office visit scheduled 8/13/2024    Requested Prescriptions     Pending Prescriptions Disp Refills    hydrOXYzine HCl (ATARAX) 25 MG tablet [Pharmacy Med Name: hydrOXYzine HCL 25MG TABLET] 270 tablet 3     Sig: TAKE 1 TABLET BY MOUTH THREE TIMES A DAY FOR ITCHING

## 2024-07-15 RX ORDER — LORATADINE 10 MG/1
10 TABLET ORAL DAILY
Qty: 90 TABLET | Refills: 0 | Status: SHIPPED | OUTPATIENT
Start: 2024-07-15

## 2024-07-15 NOTE — TELEPHONE ENCOUNTER
Last office visit 2/13/2024     Last written      Next office visit scheduled 8/13/2024    Requested Prescriptions     Pending Prescriptions Disp Refills    loratadine (CLARITIN) 10 MG tablet [Pharmacy Med Name: LORATADINE 10 MG TABLET] 90 tablet 0     Sig: TAKE 1 TABLET BY MOUTH DAILY

## 2024-07-26 NOTE — TELEPHONE ENCOUNTER
Last office visit 2/13/2024     Last written      Next office visit scheduled 8/13/2024    Requested Prescriptions     Pending Prescriptions Disp Refills    meloxicam (MOBIC) 15 MG tablet [Pharmacy Med Name: MELOXICAM 15 MG TABLET] 90 tablet 1     Sig: TAKE 1 TABLET BY MOUTH DAILY             
no

## 2024-08-13 ENCOUNTER — OFFICE VISIT (OUTPATIENT)
Dept: FAMILY MEDICINE CLINIC | Age: 69
End: 2024-08-13
Payer: COMMERCIAL

## 2024-08-13 VITALS — WEIGHT: 189.2 LBS | DIASTOLIC BLOOD PRESSURE: 70 MMHG | BODY MASS INDEX: 29.63 KG/M2 | SYSTOLIC BLOOD PRESSURE: 128 MMHG

## 2024-08-13 DIAGNOSIS — E78.2 MIXED HYPERLIPIDEMIA: ICD-10-CM

## 2024-08-13 DIAGNOSIS — H93.13 BILATERAL TINNITUS: ICD-10-CM

## 2024-08-13 DIAGNOSIS — Z82.49 FAMILY HISTORY OF EARLY CAD: Primary | ICD-10-CM

## 2024-08-13 PROCEDURE — 1123F ACP DISCUSS/DSCN MKR DOCD: CPT | Performed by: FAMILY MEDICINE

## 2024-08-13 PROCEDURE — G2211 COMPLEX E/M VISIT ADD ON: HCPCS | Performed by: FAMILY MEDICINE

## 2024-08-13 PROCEDURE — 99214 OFFICE O/P EST MOD 30 MIN: CPT | Performed by: FAMILY MEDICINE

## 2024-08-13 RX ORDER — LEVOTHYROXINE SODIUM 112 UG/1
112 TABLET ORAL DAILY
Qty: 90 TABLET | Refills: 1 | Status: SHIPPED | OUTPATIENT
Start: 2024-08-13

## 2024-08-13 NOTE — TELEPHONE ENCOUNTER
Last office visit 2/13/2024       Next office visit scheduled 8/13/2024    Requested Prescriptions     Pending Prescriptions Disp Refills    levothyroxine (SYNTHROID) 112 MCG tablet [Pharmacy Med Name: LEVOTHYROXINE 112 MCG TABLET] 90 tablet 1     Sig: Take 1 tablet by mouth daily       
None

## 2024-08-13 NOTE — PROGRESS NOTES
2024     Naresh Adkins (:  1955) is a 69 y.o. male, here for evaluation of the following medical concerns:    HPI    Patient presented today for follow up concerning chronic medical conditions.   Overall he is feeling well.      1.  Hypothyroidism- on medication for over one year, patient had had this checked for sometime, is on 125 mcg, tolerates well, needs this checked today, denied fatigue, tremors etc.. takes atarax as a side effect to the synthroid medication.      2.  GERD- patient is well controlled on Pepcid, no side effects tolerates medication well.  Reduced his Pepcid to 40 mg at night, down from 40 mg BID.     3.  PSA screening     4.. hyperlipididemia- patient was supposed to diet and exercise in an attempt to lose weight, he has lost 3 pounds,  patient will have lipids checked today. History of high cholesterol, father passed away or CAD.  will order CT calcium score.     5.  Tinnitus both ears, not a new problem     Today, chest pain, sob, n, v,or diarrhea.  Review of Systems   Constitutional:  Positive for fatigue. Negative for activity change, fever and unexpected weight change.   HENT:  Positive for hearing loss. Negative for congestion, ear pain, facial swelling, rhinorrhea, sinus pressure, sore throat and trouble swallowing.    Eyes:  Negative for discharge and visual disturbance.   Respiratory:  Negative for cough, chest tightness, shortness of breath and wheezing.    Cardiovascular:  Negative for chest pain, palpitations and leg swelling.   Gastrointestinal:  Negative for abdominal pain, anal bleeding, blood in stool, diarrhea, nausea and vomiting.   Endocrine: Negative for cold intolerance, heat intolerance, polydipsia and polyphagia.   Genitourinary:  Negative for decreased urine volume, dysuria, frequency, genital sores, penile discharge, penile pain, testicular pain and urgency.   Musculoskeletal:  Positive for arthralgias.   Skin:  Negative for rash.

## 2024-08-26 ENCOUNTER — HOSPITAL ENCOUNTER (OUTPATIENT)
Dept: CT IMAGING | Age: 69
Discharge: HOME | End: 2024-08-26
Payer: COMMERCIAL

## 2024-08-26 DIAGNOSIS — Z82.49 FAMILY HISTORY OF EARLY CAD: ICD-10-CM

## 2024-08-26 PROCEDURE — 75571 CT HRT W/O DYE W/CA TEST: CPT

## 2024-09-09 RX ORDER — PANTOPRAZOLE SODIUM 40 MG/1
40 TABLET, DELAYED RELEASE ORAL
Qty: 90 TABLET | Refills: 0 | Status: SHIPPED | OUTPATIENT
Start: 2024-09-09

## 2024-09-10 DIAGNOSIS — K21.9 GASTROESOPHAGEAL REFLUX DISEASE WITHOUT ESOPHAGITIS: Chronic | ICD-10-CM

## 2024-09-10 DIAGNOSIS — E78.2 MIXED HYPERLIPIDEMIA: ICD-10-CM

## 2024-09-10 DIAGNOSIS — M17.0 OSTEOARTHRITIS OF BOTH KNEES, UNSPECIFIED OSTEOARTHRITIS TYPE: ICD-10-CM

## 2024-09-10 DIAGNOSIS — E07.9 THYROID DISEASE: ICD-10-CM

## 2024-09-10 RX ORDER — MELOXICAM 15 MG/1
15 TABLET ORAL DAILY
Qty: 90 TABLET | Refills: 1 | Status: SHIPPED | OUTPATIENT
Start: 2024-09-10

## 2024-09-27 RX ORDER — FLUTICASONE PROPIONATE 50 MCG
SPRAY, SUSPENSION (ML) NASAL
Qty: 1 EACH | Refills: 1 | Status: SHIPPED | OUTPATIENT
Start: 2024-09-27

## 2024-10-16 RX ORDER — LORATADINE 10 MG/1
10 TABLET ORAL DAILY
Qty: 90 TABLET | Refills: 0 | Status: SHIPPED | OUTPATIENT
Start: 2024-10-16

## 2024-10-16 NOTE — TELEPHONE ENCOUNTER
Last office visit 8/13/2024      Next office visit scheduled 2/13/2025    Requested Prescriptions     Pending Prescriptions Disp Refills    loratadine (CLARITIN) 10 MG tablet [Pharmacy Med Name: LORATADINE 10 MG TABLET] 90 tablet 0     Sig: TAKE 1 TABLET BY MOUTH DAILY

## 2024-11-18 ENCOUNTER — HOSPITAL ENCOUNTER (EMERGENCY)
Age: 69
Discharge: HOME | End: 2024-11-18
Attending: EMERGENCY MEDICINE
Payer: COMMERCIAL

## 2024-11-18 ENCOUNTER — APPOINTMENT (OUTPATIENT)
Dept: GENERAL RADIOLOGY | Age: 69
End: 2024-11-18
Payer: COMMERCIAL

## 2024-11-18 VITALS
OXYGEN SATURATION: 94 % | HEIGHT: 67 IN | WEIGHT: 196 LBS | RESPIRATION RATE: 16 BRPM | SYSTOLIC BLOOD PRESSURE: 162 MMHG | TEMPERATURE: 98.1 F | BODY MASS INDEX: 30.76 KG/M2 | HEART RATE: 79 BPM | DIASTOLIC BLOOD PRESSURE: 100 MMHG

## 2024-11-18 DIAGNOSIS — S61.312A LACERATION OF RIGHT MIDDLE FINGER WITHOUT FOREIGN BODY WITH DAMAGE TO NAIL, INITIAL ENCOUNTER: ICD-10-CM

## 2024-11-18 DIAGNOSIS — S62.660B OPEN NONDISPLACED FRACTURE OF DISTAL PHALANX OF RIGHT INDEX FINGER, INITIAL ENCOUNTER: ICD-10-CM

## 2024-11-18 DIAGNOSIS — S61.310A LACERATION OF RIGHT INDEX FINGER WITHOUT FOREIGN BODY WITH DAMAGE TO NAIL, INITIAL ENCOUNTER: Primary | ICD-10-CM

## 2024-11-18 PROCEDURE — 12002 RPR S/N/AX/GEN/TRNK2.6-7.5CM: CPT

## 2024-11-18 PROCEDURE — 6360000002 HC RX W HCPCS

## 2024-11-18 PROCEDURE — 96374 THER/PROPH/DIAG INJ IV PUSH: CPT

## 2024-11-18 PROCEDURE — 73130 X-RAY EXAM OF HAND: CPT

## 2024-11-18 PROCEDURE — 2580000003 HC RX 258

## 2024-11-18 PROCEDURE — 2500000003 HC RX 250 WO HCPCS

## 2024-11-18 PROCEDURE — 99284 EMERGENCY DEPT VISIT MOD MDM: CPT

## 2024-11-18 RX ORDER — LIDOCAINE HYDROCHLORIDE 10 MG/ML
5 INJECTION, SOLUTION INFILTRATION; PERINEURAL ONCE
Status: COMPLETED | OUTPATIENT
Start: 2024-11-18 | End: 2024-11-18

## 2024-11-18 RX ORDER — CEPHALEXIN 500 MG/1
500 CAPSULE ORAL 4 TIMES DAILY
Qty: 20 CAPSULE | Refills: 0 | Status: SHIPPED | OUTPATIENT
Start: 2024-11-18 | End: 2024-11-23

## 2024-11-18 RX ADMIN — LIDOCAINE HYDROCHLORIDE 5 ML: 10 INJECTION, SOLUTION INFILTRATION; PERINEURAL at 14:54

## 2024-11-18 RX ADMIN — CEFAZOLIN 1000 MG: 1 INJECTION, POWDER, FOR SOLUTION INTRAMUSCULAR; INTRAVENOUS at 14:54

## 2024-11-18 ASSESSMENT — PAIN DESCRIPTION - PAIN TYPE: TYPE: ACUTE PAIN

## 2024-11-18 ASSESSMENT — PAIN - FUNCTIONAL ASSESSMENT: PAIN_FUNCTIONAL_ASSESSMENT: 0-10

## 2024-11-18 ASSESSMENT — PAIN SCALES - GENERAL: PAINLEVEL_OUTOF10: 1

## 2024-11-18 ASSESSMENT — PAIN DESCRIPTION - LOCATION: LOCATION: FINGER (COMMENT WHICH ONE)

## 2024-11-18 ASSESSMENT — PAIN DESCRIPTION - ORIENTATION: ORIENTATION: RIGHT

## 2024-11-18 NOTE — ED NOTES
Finger cleaned and irrigated. Finger still bleeding, pressure applied at this time.      Josh Ndiaye, RN  11/18/24 1767

## 2024-11-18 NOTE — DISCHARGE INSTRUCTIONS
Lacerations to index and middle finger right hand.  X-ray did show possible open fracture due to right index finger on x-ray.  Due to increased risk for infection you were started on antibiotics including an IV dose today in the ER.  You were given prescription for Keflex antibiotic to continue.  Even discussed with orthopedist  with contact information provided.  He says he can follow-up in the clinic tomorrow or Wednesday.  Please call  tomorrow.  Lacerations were repaired with close approximation of wound.  Please keep covered with nonadherent dressing.  Can clean with soap and water only.  Watch for signs of infection including redness, swelling, discharge.  Return to ER if you have any worsening symptoms or concerns for further evaluation.

## 2024-11-18 NOTE — ED PROVIDER NOTES
instructions.  He was provided with contact information for orthopedic clinic follow-up tomorrow for further evaluation.  He was given prescription for continued antibiotics to start tomorrow.  He was given parameters for return to ER for worsening symptoms or new concerns for further evaluation.  Patient verbalized understanding.      Medical Decision Making  Amount and/or Complexity of Data Reviewed  Radiology: ordered.    Risk  Prescription drug management.        ED Course as of 11/18/24 1746   Mon Nov 18, 2024   1257 Digital block performed right second and third digits with 1% lidocaine. [MR]      ED Course User Index  [MR] Casey Pairs HUMA, APRN - CNP         This patient was also evaluated by the attending physician. All care plans werediscussed and agreed upon.    Clinical Impression     1. Laceration of right index finger without foreign body with damage to nail, initial encounter    2. Laceration of right middle finger without foreign body with damage to nail, initial encounter    3. Open nondisplaced fracture of distal phalanx of right index finger, initial encounter        Disposition     PATIENT REFERRED TO:  Deshaun Sousa MD  [address] [facility] [address]  [phone]    Schedule an appointment as soon as possible for a visit in 1 day  [facility]  [address] [place], [place] [id number] [phone]    If symptoms worsen      DISCHARGE MEDICATIONS:  Discharge Medication List as of 11/18/2024  3:41 PM        START taking these medications    Details   cephALEXin (KEFLEX) 500 MG capsule Take 1 capsule by mouth 4 times daily for 5 days, Disp-20 capsule, R-0Normal             DISPOSITION Decision To Discharge 11/18/2024 03:26:28 PM             Diagnostic Results And Other Data         RADIOLOGY:  XR HAND RIGHT (MIN 3 VIEWS)   Final Result      3 x 9 mm osseous fragment is seen adjacent to the second distal

## 2024-12-02 RX ORDER — TADALAFIL 20 MG/1
TABLET ORAL
Qty: 30 TABLET | Refills: 2 | Status: SHIPPED | OUTPATIENT
Start: 2024-12-02

## 2024-12-02 NOTE — TELEPHONE ENCOUNTER
Last office visit 8/13/2024       Next office visit scheduled Visit date not found    Requested Prescriptions     Pending Prescriptions Disp Refills    tadalafil (CIALIS) 20 MG tablet [Pharmacy Med Name: TADALAFIL 20 MG TABLET] 30 tablet 2     Sig: TAKE ONE TABLET BY MOUTH  AS NEEDED FOR ERECTILE DYSFUNCTION

## 2024-12-06 RX ORDER — PANTOPRAZOLE SODIUM 40 MG/1
40 TABLET, DELAYED RELEASE ORAL
Qty: 90 TABLET | Refills: 0 | Status: SHIPPED | OUTPATIENT
Start: 2024-12-06

## 2024-12-06 NOTE — TELEPHONE ENCOUNTER
Last office visit 8/13/2024      Next office visit scheduled Visit date not found    Requested Prescriptions     Pending Prescriptions Disp Refills    pantoprazole (PROTONIX) 40 MG tablet [Pharmacy Med Name: PANTOPRAZOLE SOD DR 40 MG TAB] 90 tablet 0     Sig: TAKE ONE TABLET BY MOUTH EVERY MORNING BEFORE BREAKFAST

## 2025-01-20 RX ORDER — LORATADINE 10 MG/1
10 TABLET ORAL DAILY
Qty: 90 TABLET | Refills: 0 | Status: SHIPPED | OUTPATIENT
Start: 2025-01-20

## 2025-01-20 NOTE — TELEPHONE ENCOUNTER
Last office visit 8/13/2024       Next office visit scheduled Visit date not found    Requested Prescriptions     Pending Prescriptions Disp Refills    loratadine (CLARITIN) 10 MG tablet [Pharmacy Med Name: LORATADINE 10 MG TABLET] 90 tablet 0     Sig: TAKE 1 TABLET BY MOUTH DAILY

## 2025-02-06 NOTE — TELEPHONE ENCOUNTER
Last office visit 8/13/2024       Next office visit scheduled Visit date not found    Requested Prescriptions     Pending Prescriptions Disp Refills    levothyroxine (SYNTHROID) 112 MCG tablet [Pharmacy Med Name: LEVOTHYROXINE 112 MCG TABLET] 90 tablet 1     Sig: TAKE 1 TABLET BY MOUTH DAILY

## 2025-02-07 RX ORDER — LEVOTHYROXINE SODIUM 112 UG/1
112 TABLET ORAL DAILY
Qty: 90 TABLET | Refills: 1 | Status: SHIPPED | OUTPATIENT
Start: 2025-02-07

## 2025-02-28 RX ORDER — TADALAFIL 20 MG/1
TABLET ORAL
Qty: 30 TABLET | Refills: 2 | Status: SHIPPED | OUTPATIENT
Start: 2025-02-28

## 2025-03-04 ENCOUNTER — TELEPHONE (OUTPATIENT)
Dept: INTERNAL MEDICINE CLINIC | Age: 70
End: 2025-03-04

## 2025-03-04 NOTE — TELEPHONE ENCOUNTER
Called Pt to let him know Dr Perera will be leaving the office Pt would like me to call back around 3pm

## 2025-03-05 RX ORDER — PANTOPRAZOLE SODIUM 40 MG/1
40 TABLET, DELAYED RELEASE ORAL
Qty: 90 TABLET | Refills: 0 | Status: SHIPPED | OUTPATIENT
Start: 2025-03-05

## 2025-03-17 DIAGNOSIS — E07.9 THYROID DISEASE: ICD-10-CM

## 2025-03-17 DIAGNOSIS — K21.9 GASTROESOPHAGEAL REFLUX DISEASE WITHOUT ESOPHAGITIS: Chronic | ICD-10-CM

## 2025-03-17 DIAGNOSIS — M17.0 OSTEOARTHRITIS OF BOTH KNEES, UNSPECIFIED OSTEOARTHRITIS TYPE: ICD-10-CM

## 2025-03-17 DIAGNOSIS — E78.2 MIXED HYPERLIPIDEMIA: ICD-10-CM

## 2025-03-18 RX ORDER — MELOXICAM 15 MG/1
15 TABLET ORAL DAILY
Qty: 90 TABLET | Refills: 1 | Status: SHIPPED | OUTPATIENT
Start: 2025-03-18

## 2025-04-03 ENCOUNTER — HOSPITAL ENCOUNTER (OUTPATIENT)
Dept: CT IMAGING | Age: 70
Discharge: HOME | End: 2025-04-03
Payer: COMMERCIAL

## 2025-04-03 ENCOUNTER — TRANSCRIBE ORDERS (OUTPATIENT)
Dept: ADMINISTRATIVE | Age: 70
End: 2025-04-03

## 2025-04-03 DIAGNOSIS — R10.33 PERIUMBILICAL ABDOMINAL PAIN: Primary | ICD-10-CM

## 2025-04-03 DIAGNOSIS — R10.33 PERIUMBILICAL ABDOMINAL PAIN: ICD-10-CM

## 2025-04-03 LAB
PERFORMED ON: ABNORMAL
POC CREATININE: 1.6 MG/DL (ref 0.8–1.3)
POC SAMPLE TYPE: ABNORMAL

## 2025-04-03 PROCEDURE — 6360000004 HC RX CONTRAST MEDICATION

## 2025-04-03 PROCEDURE — 74177 CT ABD & PELVIS W/CONTRAST: CPT

## 2025-04-03 PROCEDURE — 82565 ASSAY OF CREATININE: CPT

## 2025-04-03 RX ORDER — IOPAMIDOL 755 MG/ML
75 INJECTION, SOLUTION INTRAVASCULAR
Status: COMPLETED | OUTPATIENT
Start: 2025-04-03 | End: 2025-04-03

## 2025-04-03 RX ADMIN — IOPAMIDOL 75 ML: 755 INJECTION, SOLUTION INTRAVENOUS at 15:02

## 2025-05-29 ENCOUNTER — TRANSCRIBE ORDERS (OUTPATIENT)
Dept: ADMINISTRATIVE | Age: 70
End: 2025-05-29

## 2025-05-29 DIAGNOSIS — F17.210 CIGARETTE NICOTINE DEPENDENCE WITHOUT COMPLICATION: Primary | ICD-10-CM

## 2025-05-29 DIAGNOSIS — Z87.891 PERSONAL HISTORY OF TOBACCO USE: ICD-10-CM

## 2025-06-13 ENCOUNTER — HOSPITAL ENCOUNTER (OUTPATIENT)
Dept: CT IMAGING | Age: 70
Discharge: HOME | End: 2025-06-13
Payer: COMMERCIAL

## 2025-06-13 DIAGNOSIS — F17.210 CIGARETTE NICOTINE DEPENDENCE WITHOUT COMPLICATION: ICD-10-CM

## 2025-06-13 DIAGNOSIS — Z87.891 PERSONAL HISTORY OF TOBACCO USE: ICD-10-CM

## 2025-06-13 PROCEDURE — 71271 CT THORAX LUNG CANCER SCR C-: CPT

## (undated) DEVICE — ZIMMER® STERILE DISPOSABLE TOURNIQUET CUFF WITH PLC, DUAL PORT, SINGLE BLADDER, 18 IN. (46 CM)

## (undated) DEVICE — DRESSING PETRO W3XL3IN OIL EMUL N ADH GZ KNIT IMPREG CELOS

## (undated) DEVICE — BANDAGE COMPR W4INXL12FT E DISP ESMARCH EVEN

## (undated) DEVICE — GOWN SIRUS NONREIN XL W/TWL: Brand: MEDLINE INDUSTRIES, INC.

## (undated) DEVICE — Z DISCONTINUED USE 2275676 GLOVE SURG SZ 65 L12IN FNGR THK87MIL DK GRN LTX FREE ISOLEX

## (undated) DEVICE — NEEDLE HYPO 25GA L1.5IN BVL ORIENTED ECLIPSE

## (undated) DEVICE — UNDERGLOVE SURG SZ 8 FNGR THK0.21MIL GRN LTX BEAD CUF

## (undated) DEVICE — SPONGE GZ W4XL4IN COT 12 PLY TYP VII WVN C FLD DSGN

## (undated) DEVICE — DRAPE HND W114XL142IN BLU POLYPR W O PCH FEN CRD AND TB HLDR

## (undated) DEVICE — GLOVE SURG SZ 75 CRM LTX FREE POLYISOPRENE POLYMER BEAD ANTI

## (undated) DEVICE — GOWN,SIRUS,POLYRNF,BRTHSLV,XL,30/CS: Brand: MEDLINE

## (undated) DEVICE — SOLUTION IV IRRIG 250ML ST LF 0.9% SODIUM 2F7122

## (undated) DEVICE — SHEET,DRAPE,53X77,STERILE: Brand: MEDLINE

## (undated) DEVICE — SYRINGE MED 10ML LUERLOCK TIP W/O SFTY DISP

## (undated) DEVICE — SUTURE CHROMIC GUT SZ 4-0 L27IN ABSRB BRN FS-2 L19MM 3/8 635H

## (undated) DEVICE — COVER LT HNDL BLU PLAS

## (undated) DEVICE — MINOR SET UP PK

## (undated) DEVICE — GLOVE SURG SZ 65 CRM LTX FREE POLYISOPRENE POLYMER BEAD ANTI

## (undated) DEVICE — APPLICATOR MEDICATED 26 CC SOLUTION HI LT ORNG CHLORAPREP

## (undated) DEVICE — NEEDLE HYPO 18GA L1.5IN THN WALL PIVOTING SHLD BVL ORIENTED